# Patient Record
Sex: FEMALE | URBAN - METROPOLITAN AREA
[De-identification: names, ages, dates, MRNs, and addresses within clinical notes are randomized per-mention and may not be internally consistent; named-entity substitution may affect disease eponyms.]

---

## 2019-01-30 ENCOUNTER — APPOINTMENT (RX ONLY)
Dept: URBAN - METROPOLITAN AREA CLINIC 24 | Facility: CLINIC | Age: 64
Setting detail: DERMATOLOGY
End: 2019-01-30

## 2019-01-30 DIAGNOSIS — L28.1 PRURIGO NODULARIS: ICD-10-CM

## 2019-01-30 PROBLEM — L85.3 XEROSIS CUTIS: Status: ACTIVE | Noted: 2019-01-30

## 2019-01-30 PROBLEM — L29.8 OTHER PRURITUS: Status: ACTIVE | Noted: 2019-01-30

## 2019-01-30 PROCEDURE — ? PRESCRIPTION

## 2019-01-30 PROCEDURE — ? COUNSELING

## 2019-01-30 PROCEDURE — 99202 OFFICE O/P NEW SF 15 MIN: CPT

## 2019-01-30 RX ORDER — TRIAMCINOLONE ACETONIDE 1 MG/G
1 OINTMENT TOPICAL BID
Qty: 1 | Refills: 3 | COMMUNITY
Start: 2019-01-30

## 2019-01-30 RX ADMIN — TRIAMCINOLONE ACETONIDE 1: 1 OINTMENT TOPICAL at 17:22

## 2019-01-30 ASSESSMENT — LOCATION DETAILED DESCRIPTION DERM
LOCATION DETAILED: SUPERIOR THORACIC SPINE
LOCATION DETAILED: LEFT PROXIMAL DORSAL FOREARM
LOCATION DETAILED: MIDDLE STERNUM
LOCATION DETAILED: RIGHT PROXIMAL DORSAL FOREARM

## 2019-01-30 ASSESSMENT — LOCATION ZONE DERM
LOCATION ZONE: ARM
LOCATION ZONE: TRUNK

## 2019-01-30 ASSESSMENT — LOCATION SIMPLE DESCRIPTION DERM
LOCATION SIMPLE: LEFT FOREARM
LOCATION SIMPLE: UPPER BACK
LOCATION SIMPLE: RIGHT FOREARM
LOCATION SIMPLE: CHEST

## 2019-03-13 ENCOUNTER — APPOINTMENT (RX ONLY)
Dept: URBAN - METROPOLITAN AREA CLINIC 24 | Facility: CLINIC | Age: 64
Setting detail: DERMATOLOGY
End: 2019-03-13

## 2019-03-13 DIAGNOSIS — L90.5 SCAR CONDITIONS AND FIBROSIS OF SKIN: ICD-10-CM

## 2019-03-13 DIAGNOSIS — L81.0 POSTINFLAMMATORY HYPERPIGMENTATION: ICD-10-CM

## 2019-03-13 DIAGNOSIS — L28.1 PRURIGO NODULARIS: ICD-10-CM | Status: IMPROVED

## 2019-03-13 PROCEDURE — 99213 OFFICE O/P EST LOW 20 MIN: CPT

## 2019-03-13 PROCEDURE — ? COUNSELING

## 2019-03-13 PROCEDURE — ? TREATMENT REGIMEN

## 2019-03-13 ASSESSMENT — LOCATION DETAILED DESCRIPTION DERM
LOCATION DETAILED: SUPERIOR THORACIC SPINE
LOCATION DETAILED: RIGHT PROXIMAL DORSAL FOREARM
LOCATION DETAILED: PERIUMBILICAL SKIN
LOCATION DETAILED: MIDDLE STERNUM
LOCATION DETAILED: LEFT PROXIMAL DORSAL FOREARM

## 2019-03-13 ASSESSMENT — LOCATION SIMPLE DESCRIPTION DERM
LOCATION SIMPLE: UPPER BACK
LOCATION SIMPLE: ABDOMEN
LOCATION SIMPLE: RIGHT FOREARM
LOCATION SIMPLE: LEFT FOREARM
LOCATION SIMPLE: CHEST

## 2019-03-13 ASSESSMENT — LOCATION ZONE DERM
LOCATION ZONE: TRUNK
LOCATION ZONE: ARM

## 2019-03-13 NOTE — PROCEDURE: TREATMENT REGIMEN
Detail Level: Zone
Plan: To consider treatment of pigmentation in the future. \\nAdvised pt that TAC oint should not be used for hyperpigmentation\\nWill address at follow up in 3-4 months

## 2019-11-12 NOTE — EDPHYS
Physician Documentation                                                                           

 [facility]                                                                 

Name: Nery Gaffney                                                                            

Age: 63 yrs                                                                                       

Sex: Female                                                                                       

: 1955                                                                                   

MRN: [id number]                                                                                   

Arrival Date: 2019                                                                          

Time: 13:56                                                                                       

Account#: [id number]                                                                            

Bed 13                                                                                            

Private MD: Bcuk Pandey R ED Physician Brown Dumont                                                                         

HPI:                                                                                              

                                                                                             

15:01 This 63 yrs old  Female presents to ER via Ambulatory with complaints of Wrist kb  

      Pain.                                                                                       

15:01 The patient or guardian reports decreased range of motion, pain, swelling, tenderness.  kb  

15:02 The patient or guardian reports decreased range of motion, pain, swelling. The          kb  

      complaints affect the left wrist diffusely. Context: The problem was sustained at home,     

      resulted from an unknown cause. Onset: The symptoms/episode began/occurred 2 day(s)         

      ago. Modifying factors: The symptoms are alleviated by nothing, the symptoms are            

      aggravated by nothing. Associated signs and symptoms: The patient has no apparent           

      associated signs or symptoms. The patient has not experienced similar symptoms in the       

      past. The patient has not recently seen a physician. Pt reports she went to Dr Pandey        

      today for redness, swelling, pain and decreased ROM in left wrist. He sent her here for     

      gout vs septic joint.                                                                       

                                                                                                  

Historical:                                                                                       

- Allergies:                                                                                      

14:04 HYDROCODONE;                                                                            tw2 

14:04 Iodine; IV contrast;                                                                    tw2 

- Home Meds:                                                                                      

14:04 metformin 1,000 mg oral tab 1 tab 2 times per day [Active];                             tw2 

      lisinopril-hydrochlorothiazide 20-25 mg Oral tab 1 tab once daily [Active]; glimepiride     

      4 mg Oral tab 1 tab twice a day [Active]; trazodone 100 mg Oral tab 1 tab daily             

      [Active]; cyclobenzaprine 10 mg oral tab [Active]; simvastatin 40 mg Oral tab 1 tab         

      once daily [Active]; gabapentin 300 mg oral cap 2 caps nightly [Active]; basaglar 15        

      units sq [Active];                                                                          

- PMHx:                                                                                           

14:04 Depression; Diabetes - NIDDM; Hyperlipidemia; Hypertension;                             tw2 

                                                                                                  

- Immunization history:: Adult Immunizations.                                                     

- Social history:: Smoking status: Patient uses tobacco products, smokes one pack                 

  cigarettes per day.                                                                             

- Ebola Screening: : Patient denies travel to an Ebola-affected area in the 21 days               

  before illness onset.                                                                           

                                                                                                  

                                                                                                  

ROS:                                                                                              

14:59 Constitutional: Negative for fever, chills, and weight loss, Cardiovascular: Negative   kb  

      for chest pain, palpitations, and edema, Respiratory: Negative for shortness of breath,     

      cough, wheezing, and pleuritic chest pain, Abdomen/GI: Negative for abdominal pain,         

      nausea, vomiting, diarrhea, and constipation, Back: Negative for injury and pain, :       

      Negative for injury, bleeding, discharge, and swelling, Neuro: Negative for headache,       

      weakness, numbness, tingling, and seizure.                                                  

14:59 MS/extremity: Positive for decreased range of motion, erythema, pain, swelling, of the      

      left wrist.                                                                                 

                                                                                                  

Exam:                                                                                             

14:59 Constitutional:  This is a well developed, well nourished patient who is awake, alert,  kb  

      and in no acute distress. Head/Face:  Normocephalic, atraumatic. ENT:  Nares patent. No     

      nasal discharge, no septal abnormalities noted.  Tympanic membranes are normal and          

      external auditory canals are clear.  Oropharynx with no redness, swelling, or masses,       

      exudates, or evidence of obstruction, uvula midline.  Mucous membranes moist. Neck:         

      Trachea midline, no thyromegaly or masses palpated, and no cervical lymphadenopathy.        

      Supple, full range of motion without nuchal rigidity, or vertebral point tenderness.        

      No Meningismus. Chest/axilla:  Normal chest wall appearance and motion.  Nontender with     

      no deformity.  No lesions are appreciated. Cardiovascular:  Regular rate and rhythm         

      with a normal S1 and S2.  No gallops, murmurs, or rubs.  Normal PMI, no JVD.  No pulse      

      deficits. Respiratory:  Lungs have equal breath sounds bilaterally, clear to                

      auscultation and percussion.  No rales, rhonchi or wheezes noted.  No increased work of     

      breathing, no retractions or nasal flaring. Abdomen/GI:  Soft, non-tender, with normal      

      bowel sounds.  No distension or tympany.  No guarding or rebound.  No evidence of           

      tenderness throughout. Neuro:  Awake and alert, GCS 15, oriented to person, place,          

      time, and situation.  Cranial nerves II-XII grossly intact.  Motor strength 5/5 in all      

      extremities.  Sensory grossly intact.  Cerebellar exam normal.  Normal gait.                

14:59 Musculoskeletal/extremity: Extremities: grossly normal except: noted in the left wrist:     

      decreased ROM, erythema, pain, swelling, ROM: limited active range of motion,               

      Circulation is intact in all extremities. Sensation intact.                                 

                                                                                                  

Vital Signs:                                                                                      

14:01  / 78; Pulse 102; Resp 17; Temp 97.8(O); Pulse Ox 97% on R/A; Weight 68.04 kg     tw2 

      (R); Height 5 ft. 7 in. (170.18 cm); Pain 8/10;                                             

15:40  / 82; Pulse 95; Resp 16; Temp 98.0(O); Pulse Ox 96% on R/A;                      mh5 

16:46  / 82; Pulse 89; Resp 18; Temp 97.9(O); Pulse Ox 97% on R/A;                      mh5 

17:39  / 76; Pulse 88; Resp 17; Pulse Ox 95% on R/A;                                    rb1 

18:20  / 73; Pulse 89; Resp 16; Pulse Ox 96% on R/A;                                    rb1 

14:01 Body Mass Index 23.49 (68.04 kg, 170.18 cm)                                             tw2 

                                                                                                  

MDM:                                                                                              

14:15 Patient medically screened.                                                             kb  

15:01 Data reviewed: vital signs, nurses notes. Data interpreted: Pulse oximetry: on room air kb  

      is 97 %. Interpretation: normal.                                                            

15:58 ED course: Dr Dumont at bedside to obtain sample of joint fluid..                        kb  

17:54 ED course: Consulted Marisel Scherer, given fluid cell count analysis, he believes is more rn  

      inflammatory arthritis, mild elevation in CRP and WBC, but normal ESR and                   

      procalcitonin. Not enough fluid to evaluate for crystals, but could be gout or              

      pseudogout. Pt afebrile, non-IV drug user, and improved ROM after joint aspiration . If     

      gram stain neg for organisms, will dc home..                                                

18:03 Counseling: I had a detailed discussion with the patient and/or guardian regarding: the kb  

      historical points, exam findings, and any diagnostic results supporting the                 

      discharge/admit diagnosis, lab results, radiology results, the need for outpatient          

      follow up, a orthopedic surgeon, to return to the emergency department if symptoms          

      worsen or persist or if there are any questions or concerns that arise at home.             

                                                                                                  

                                                                                             

14:29 Order name: CBC with Diff; Complete Time: 15:39                                         kb  

                                                                                             

14:29 Order name: Basic Metabolic Panel; Complete Time: 15:27                                 kb  

                                                                                             

14:29 Order name: Procalcitonin; Complete Time: 15:55                                         kb  

                                                                                             

14:29 Order name: Blood Culture Adult (2)                                                     kb  

                                                                                             

14:29 Order name: Sed Rate; Complete Time: 15:39                                              kb  

                                                                                             

14:29 Order name: CRP; Complete Time: 15:27                                                   kb  

                                                                                             

14:29 Order name: IV Start; Complete Time: 16:43                                              kb  

                                                                                             

16:15 Order name: Body Fluid Culture                                                          kb  

                                                                                             

16:15 Order name: Fluid Cell Count,Body; Complete Time: 17:37                                 kb  

                                                                                             

17:04 Order name: Wrist Left (3 View) XRAY: base of thumb; Complete Time: 17:48               sp  

                                                                                             

18:00 Order name: Uric Acid; Complete Time: 07:04                                             kb  

                                                                                                  

Administered Medications:                                                                         

16:10 Drug: Lidocaine (1 %) 1 vials Volume: 5 ml; Route: Infiltration;                        rb1 

                                                                                                  

                                                                                                  

Disposition:                                                                                      

18:31 Co-signature as Attending Physician, Brown Dumont MD.                                    rn  

                                                                                                  

Disposition:                                                                                      

19 18:04 Discharged to Home. Impression: Pain in left wrist.                                

- Condition is Stable.                                                                            

- Discharge Instructions: Wrist Pain, Easy-to-Read.                                               

- Prescriptions for Tramadol 50 mg Oral Tablet - take 1 tablet by ORAL route every 8              

  hours as needed; 12 tablet. indomethacin 25 mg Oral capsule - take 1 capsule by ORAL            

  route 3 times per day As needed with food; 30 capsule.                                          

- Medication Reconciliation Form, Thank You Letter, Antibiotic Education, Prescription            

  Opioid Use form.                                                                                

- Follow up: Emergency Department; When: As needed; Reason: Worsening of condition.               

  Follow up: Buck Pandey MD; When: 2 - 3 days; Reason: Recheck today's complaints,              

  Continuance of care, Re-evaluation by your physician. Follow up: Carlitos Scherer MD;           

  When: 2 - 3 days; Reason: Recheck today's complaints.                                           

                                                                                                  

                                                                                                  

                                                                                                  

Signatures:                                                                                       

Dispatcher [employer]                           Leslie Escobar, AMEENAP-C                 FNP-Ckb                                                   

Herlinda Whitaker, Brown Kaur RN, MD MD rn Barber, Rebecca, RN RN   rb1                                                  

Dianna Oakley RN RN   tw2                                                  

                                                                                                  

Corrections: (The following items were deleted from the chart)                                    

15:02 15:01 This 63 yrs old  Female presents to ER via Ambulatory with complaints of kb  

      Hand Pain. kb                                                                               

18:04 16:15 FLUID CRYSTALS+U.LAB.BRZ ordered. EDMS                                            EDMS

18:25 18:04 2019 18:04 Discharged to Home. Impression: Pain in left wrist. Condition is iw  

      Stable. Forms are Medication Reconciliation Form, Thank You Letter, Antibiotic              

      Education, Prescription Opioid Use. Follow up: Emergency Department; When: As needed;       

      Reason: Worsening of condition. Follow up: Buck Pandey; When: 2 - 3 days; Reason:           

      Recheck today's complaints, Continuance of care, Re-evaluation by your physician.           

      Follow up: Carlitos Scherer; When: 2 - 3 days; Reason: Recheck today's complaints. kb         

                                                                                                  

**************************************************************************************************

## 2019-11-12 NOTE — ER
Nurse's Notes                                                                                     

 [facility]                                                                 

Name: Nery Gaffney                                                                            

Age: 63 yrs                                                                                       

Sex: Female                                                                                       

: 1955                                                                                   

MRN: [id number]                                                                                   

Arrival Date: 2019                                                                          

Time: 13:56                                                                                       

Account#: [id number]                                                                            

Bed 13                                                                                            

Private MD: Buck Pandey R                                                                       

Diagnosis: Pain in left wrist                                                                     

                                                                                                  

Presentation:                                                                                     

                                                                                             

14:00 Presenting complaint: Patient states: it started hurting yesterday, and it has been     tw2 

      swollen and red and Dr. Pandey was worried about me being septic and he said it might        

      just be gout but to come here. Transition of care: patient was not received from            

      another setting of care. Onset of symptoms was 2019. Risk Assessment: Do       

      you want to hurt yourself or someone else? Patient reports no desire to harm self or        

      others. Initial Sepsis Screen: Does the patient meet any 2 criteria? No. Patient's          

      initial sepsis screen is negative. Does the patient have a suspected source of              

      infection? No. Patient's initial sepsis screen is negative. Care prior to arrival: None.    

14:00 Method Of Arrival: Ambulatory                                                           tw2 

14:00 Acuity: RANJEET 3                                                                           tw2 

                                                                                                  

Triage Assessment:                                                                                

14:01 General: Appears in no apparent distress. Behavior is calm, cooperative, appropriate    tw2 

      for age. Pain: Complains of pain in left hand. Musculoskeletal: Swelling present in         

      left hand.                                                                                  

                                                                                                  

Historical:                                                                                       

- Allergies:                                                                                      

14:04 HYDROCODONE;                                                                            tw2 

14:04 Iodine; IV contrast;                                                                    tw2 

- Home Meds:                                                                                      

14:04 metformin 1,000 mg oral tab 1 tab 2 times per day [Active];                             tw2 

      lisinopril-hydrochlorothiazide 20-25 mg Oral tab 1 tab once daily [Active]; glimepiride     

      4 mg Oral tab 1 tab twice a day [Active]; trazodone 100 mg Oral tab 1 tab daily             

      [Active]; cyclobenzaprine 10 mg oral tab [Active]; simvastatin 40 mg Oral tab 1 tab         

      once daily [Active]; gabapentin 300 mg oral cap 2 caps nightly [Active]; basaglar 15        

      units sq [Active];                                                                          

- PMHx:                                                                                           

14:04 Depression; Diabetes - NIDDM; Hyperlipidemia; Hypertension;                             tw2 

                                                                                                  

- Immunization history:: Adult Immunizations.                                                     

- Social history:: Smoking status: Patient uses tobacco products, smokes one pack                 

  cigarettes per day.                                                                             

- Ebola Screening: : Patient denies travel to an Ebola-affected area in the 21 days               

  before illness onset.                                                                           

                                                                                                  

                                                                                                  

Screenin:12 Abuse screen: Denies threats or abuse. Nutritional screening: No deficits noted.        tw2 

      Tuberculosis screening: No symptoms or risk factors identified. Fall Risk None              

      identified.                                                                                 

                                                                                                  

Assessment:                                                                                       

14:10 General: Appears in no apparent distress. comfortable, Behavior is calm, cooperative.   rb1 

      Pain: Complains of pain in left wrist Pain currently is 8 out of 10 on a pain scale.        

      Pain began 1 day ago. Neuro: Level of Consciousness is awake, alert, obeys commands,        

      Oriented to person, place, time, situation. Cardiovascular: Capillary refill < 3            

      seconds is brisk in bilateral fingers. Respiratory: Airway is patent Respiratory effort     

      is even, unlabored, Respiratory pattern is regular, symmetrical. GI: No signs and/or        

      symptoms were reported involving the gastrointestinal system. : No signs and/or           

      symptoms were reported regarding the genitourinary system. Derm: Skin is pink, warm \T\     

      dry. Musculoskeletal: Range of motion: limited in left hand and wrist Swelling present      

      in left wrist and left hand.                                                                

15:00 Reassessment: Patient appears in no apparent distress at this time. No changes from     rb1 

      previously documented assessment.                                                           

16:00 Reassessment: Patient appears in no apparent distress at this time. Patient and/or      rb1 

      family updated on plan of care and expected duration. Pain level reassessed. Patient is     

      alert, oriented x 3, equal unlabored respirations, skin warm/dry/pink. Set up for a         

      wrist aspiration procedure.                                                                 

16:15 Reassessment: Dr. Dumont at pt. bedside performing wrist aspiration.                     rb1 

16:50 Reassessment: Patient appears in no apparent distress at this time. Patient and/or      rb1 

      family updated on plan of care and expected duration. Pain level reassessed. Patient is     

      alert, oriented x 3, equal unlabored respirations, skin warm/dry/pink. Pt. is sitting       

      in the bedside chair watching TV.                                                           

17:40 Reassessment: Patient appears in no apparent distress at this time. No changes from     rb1 

      previously documented assessment.                                                           

18:20 Reassessment: Patient appears in no apparent distress at this time. Patient and/or      rb1 

      family updated on plan of care and expected duration. Pain level reassessed. Patient is     

      alert, oriented x 3, equal unlabored respirations, skin warm/dry/pink.                      

                                                                                                  

Vital Signs:                                                                                      

14:01  / 78; Pulse 102; Resp 17; Temp 97.8(O); Pulse Ox 97% on R/A; Weight 68.04 kg     tw2 

      (R); Height 5 ft. 7 in. (170.18 cm); Pain 8/10;                                             

15:40  / 82; Pulse 95; Resp 16; Temp 98.0(O); Pulse Ox 96% on R/A;                      mh5 

16:46  / 82; Pulse 89; Resp 18; Temp 97.9(O); Pulse Ox 97% on R/A;                      mh5 

17:39  / 76; Pulse 88; Resp 17; Pulse Ox 95% on R/A;                                    rb1 

18:20  / 73; Pulse 89; Resp 16; Pulse Ox 96% on R/A;                                    rb1 

14:01 Body Mass Index 23.49 (68.04 kg, 170.18 cm)                                             tw2 

                                                                                                  

ED Course:                                                                                        

13:56 Patient arrived in ED.                                                                  mr  

13:56 Buck Pandey MD is Private Physician.                                                  mr  

14:01 Triage completed.                                                                       tw2 

14:01 Arm band placed on.                                                                     tw2 

14:04 Placed in gown. Bed in low position. Adult w/ patient. Warm blanket given.              tw2 

14:15 [facility]                                                        kb  

14:15 Brown Dumont MD is Attending Physician.                                                kb  

14:50 Inserted saline lock: 22 gauge in right forearm, using aseptic technique. Blood         jl7 

      collected.                                                                                  

14:50 Initial lab(s) drawn, by me, sent to lab. First set of blood cultures drawn by me.      jl7 

16:42 Elina Reese, RN is Primary Nurse.                                                   rb1 

17:00 Second set of blood cultures drawn by me.                                               mh5 

17:06 Blood Culture Adult (2) Sent.                                                           mh5 

17:29 Wrist Left (3 View) XRAY: base of thumb In Process Unspecified.                         EDMS

18:04 Buck Pandey MD is Referral Physician.                                                 kb  

18:04 Carlitos Scherer MD is Referral Physician.                                              kb  

18:24 No provider procedures requiring assistance completed.                                  rb1 

18:25 IV discontinued, intact, bleeding controlled, No redness/swelling at site. Pressure     iw  

      dressing applied.                                                                           

                                                                                                  

Administered Medications:                                                                         

16:10 Drug: Lidocaine (1 %) 1 vials Volume: 5 ml; Route: Infiltration;                        rb1 

                                                                                                  

                                                                                                  

Outcome:                                                                                          

18:04 Discharge ordered by MD. last  

18:24 Discharged to home ambulatory, with family.                                             iw  

18:24 Condition: good                                                                             

18:24 Discharge instructions given to patient, family, Instructed on discharge instructions,      

      follow up and referral plans. Demonstrated understanding of instructions, follow-up         

      care, medications, Prescriptions given X 2.                                                 

18:25 Patient left the ED.                                                                    iw  

                                                                                                  

Signatures:                                                                                       

Dispatcher [employer]                           EDMS                                                 

Leslie Rudd, MAGNUS-C                 FNP-Ckandreina                                                   

Shirley Allen                                                   

Herlinda Whitaker, RN                     RN   iw                                                   

Elina Reese, RN                     RN   rb1                                                  

Dianna Oakley RN                          RN   2                                                  

Flavia Sevilla                              [facility]                                                  

Roxann Carter RN                        RN   jl7                                                  

                                                                                                  

Corrections: (The following items were deleted from the chart)                                    

14:11 14:00 Presenting complaint: Patient states: it started hurting yesterday, and it has    tw2 

      been swollen and red and Dr. Pandey was worried about me being septic and he said it         

      might just be gout tw2                                                                      

17:06 16:46  / 82; Pulse 89bpm; Resp 18bpm; Pulse Ox 97% RA; mh5                        mh5 

17:06 15:40  / 82; Pulse 95bpm; Resp 16bpm; Pulse Ox 96% RA; rb1                        mh5 

                                                                                                  

**************************************************************************************************

## 2019-11-22 NOTE — RAD REPORT
EXAM DESCRIPTION:  RAD - Wrist Left 3 View - 11/12/2019 5:29 pm

 

CLINICAL HISTORY:  Swelling;Pain

Pain

 

COMPARISON:  No comparisons

 

FINDINGS:  Soft tissue swelling is seen about the wrist. No fracture or dislocation seen. none

## 2020-07-30 NOTE — RAD REPORT
EXAM DESCRIPTION:  RAD - Chest Single View - 7/30/2020 5:08 pm

 

CLINICAL HISTORY:  left scapular pain

Chest pain.

 

COMPARISON:  Chest Single View dated 1/29/2018; Chest Single View dated 1/28/2018; Chest Pa And Lat (
2 Views) dated 10/30/2016; CHEST PA AND LAT 2 VIEW dated 9/7/2010

 

FINDINGS:  Portable technique limits examination quality.

 

Interstitial lung opacities are present bilaterally suspicious for interstitial pneumonitis. Airspace
 opacity in left lung base likely represent superimposed pneumonia with moderate left pleural effusio
n. The heart is normal in size. No displaced fractures.Follow-up CT chest may be useful for further e
valuation.

## 2020-07-30 NOTE — EDPHYS
Physician Documentation                                                                           

 [facility]                                                                 

Name: Nery Gaffney                                                                            

Age: 64 yrs                                                                                       

Sex: Female                                                                                       

: 1955                                                                                   

MRN: [id number]                                                                                   

Arrival Date: 2020                                                                          

Time: 15:42                                                                                       

Account#: [id number]                                                                            

Bed 8                                                                                             

Private MD: Buck Pandey R                                                                       

ED Physician Abdiel Ortega                                                                       

HPI:                                                                                              

                                                                                             

11:10 This 64 yrs old  Female presents to ER via Ambulatory with complaints of       kdr 

      Shoulder Pain.                                                                              

11:10 The patient or guardian complains of pain, that is acute, The patient is c/o pain in    kdr 

      the left scapular region without history of trauma. Left scapula. Context: The problem      

      was sustained at home. Onset: The symptoms/episode began/occurred gradually, at an          

      unknown time. Modifying factors: the symptoms are alleviated by nothing. The symptoms       

      are aggravated by nothing. Associated signs and symptoms: The patient has no apparent       

      associated signs or symptoms. Severity of symptoms: At their worst the symptoms were        

      mild, moderate, just prior to arrival, in the emergency department the symptoms are         

      unchanged. Treatment prior to arrival includes: no previous treatment. The patient has      

      not experienced similar symptoms in the past. The patient has not recently seen a           

      physician.                                                                                  

                                                                                                  

Historical:                                                                                       

- Allergies:                                                                                      

                                                                                             

16:13 HYDROCODONE;                                                                            ks7 

16:13 Iodine; IV contrast;                                                                    ks7 

- PMHx:                                                                                           

16:13 Depression; Diabetes - NIDDM; Hyperlipidemia; Hypertension;                             ks7 

- PSHx:                                                                                           

16:13 Hysterectomy; Hernia repair; Cholecystectomy;                                           ks7 

                                                                                                  

- Immunization history:: Adult Immunizations up to date.                                          

- Social history:: Smoking status: Patient reports the use of cigarette tobacco                   

  products, smokes one-half pack cigarettes per day.                                              

                                                                                                  

                                                                                                  

ROS:                                                                                              

                                                                                             

11:10 Constitutional: Negative for fever, chills, and weight loss, Eyes: Negative for injury, kdr 

      pain, redness, and discharge, ENT: Negative for injury, pain, and discharge, Neck:          

      Negative for injury, pain, and swelling, Cardiovascular: Negative for chest pain,           

      palpitations, and edema, Respiratory: Negative for shortness of breath, cough,              

      wheezing, and pleuritic chest pain, Abdomen/GI: Negative for abdominal pain, nausea,        

      vomiting, diarrhea, and constipation, : Negative for injury, bleeding, discharge, and     

      swelling, MS/Extremity: Negative for injury and deformity, Skin: Negative for injury,       

      rash, and discoloration, Neuro: Negative for headache, weakness, numbness, tingling,        

      and seizure activity. Psych: Negative for depression, anxiety, suicide ideation,            

      homicidal ideation, and hallucinations, Allergy/Immunology: Negative for hives, rash,       

      and allergies, Endocrine: Negative for neck swelling, polydipsia, polyuria, polyphagia,     

      and marked weight changes, Hematologic/Lymphatic: Negative for swollen nodes, abnormal      

      bleeding, and unusual bruising.                                                             

      Back: Positive for pain at rest, of the left scapular area.                                 

                                                                                                  

Exam:                                                                                             

11:10 Constitutional:  This is a well developed, well nourished patient who is awake, alert,  kdr 

      and in no acute distress. Head/Face:  Normocephalic, atraumatic. Eyes:  Pupils equal        

      round and reactive to light, extra-ocular motions intact.  Lids and lashes normal.          

      Conjunctiva and sclera are non-icteric and not injected.  Cornea within normal limits.      

      Periorbital areas with no swelling, redness, or edema. Neck:  Trachea midline, no           

      thyromegaly or masses palpated, and no cervical lymphadenopathy.  Supple, full range of     

      motion without nuchal rigidity, or vertebral point tenderness.  No Meningismus.             

      Chest/axilla:  Normal chest wall appearance and motion.  Nontender with no deformity.       

      No lesions are appreciated. Cardiovascular:  Regular rate and rhythm with a normal S1       

      and S2.  No gallops, murmurs, or rubs.  Normal PMI, no JVD.  No pulse deficits.             

      Respiratory:  Lungs have equal breath sounds bilaterally, clear to auscultation and         

      percussion.  No rales, rhonchi or wheezes noted.  No increased work of breathing, no        

      retractions or nasal flaring. Abdomen/GI:  Soft, non-tender, with normal bowel sounds.      

      No distension or tympany.  No guarding or rebound.  No evidence of tenderness               

      throughout. Back:  No spinal tenderness.  No costovertebral tenderness.  Full range of      

      motion. Skin:  Warm, dry with normal turgor.  Normal color with no rashes, no lesions,      

      and no evidence of cellulitis. MS/ Extremity:  Pulses equal, no cyanosis.                   

      Neurovascular intact.  Full, normal range of motion. Neuro:  Awake and alert, GCS 15,       

      oriented to person, place, time, and situation.  Cranial nerves II-XII grossly intact.      

      Motor strength 5/5 in all extremities.  Sensory grossly intact.  Cerebellar exam            

      normal.  Normal gait. Psych:  Awake, alert, with orientation to person, place and time.     

       Behavior, mood, and affect are within normal limits.                                       

                                                                                                  

Vital Signs:                                                                                      

                                                                                             

16:09  / 77; Pulse 110; Resp 18; Pulse Ox 96% on R/A;                                   ks7 

18:03  / 59; Pulse 108; Resp 17; Pulse Ox 96% ;                                         bp  

18:55  / 78; Pulse 112; Resp 18; Pulse Ox 96% ; Pain 0/10;                              ks7 

19:11  / 86; Pulse 106; Resp 18 S; Pulse Ox 95% on R/A;                                 jd3 

                                                                                                  

MDM:                                                                                              

19:13 Patient medically screened.                                                             [facility] 

                                                                                             

11:10 Data reviewed: vital signs, nurses notes, lab test result(s), radiologic studies.       kdr 

      Counseling: I had a detailed discussion with the patient and/or guardian regarding: the     

      historical points, exam findings, and any diagnostic results supporting the                 

      discharge/admit diagnosis, lab results, radiology results, the need for outpatient          

      follow up.                                                                                  

                                                                                                  

                                                                                             

16:26 Order name: CBC with Diff; Complete Time: 17:55                                         [facility] 

                                                                                             

16:26 Order name: Chem 7; Complete Time: 17:55                                                [facility] 

                                                                                             

16:26 Order name: CXR XRAY; Complete Time: 17:55                                              [facility] 

                                                                                             

16:26 Order name: Blood Culture Adult (2)                                                     kdr 

                                                                                             

16:26 Order name: Lactate; Complete Time: 17:55                                               [facility] 

                                                                                             

16:26 Order name: Procalcitonin; Complete Time: 17:55                                         [facility] 

                                                                                             

18:01 Order name: Thorax Wo Con; Complete Time: 19:11                                         [facility]

                                                                                                  

Administered Medications:                                                                         

                                                                                             

16:40 Drug: NS 0.9% 500 ml Route: IV; Rate: bolus; Site: left antecubital;                    bp  

16:40 Drug: Rocephin - (cefTRIAXone) 1 grams Route: IVPB; Infused Over: 30 mins; Site: left   bp  

      antecubital;                                                                                

                                                                                                  

                                                                                                  

Disposition:                                                                                      

20 19:13 Discharged to Home. Impression: Left scapular pain, mediatinal mass, loculated     

  left pleural effusion.                                                                          

- Condition is Stable.                                                                            

- Discharge Instructions: Shoulder Pain, Easy-to-Read, Lung Cancer.                               

- Prescriptions for Tramadol 50 mg Oral Tablet - take 1 tablet by ORAL route every 8              

  hours as needed; 16 tablet. Albuterol Sulfate 90 mcg/actuation - inhale 1-2 puff by             

  INHALATION route every 4-6 hours; 1 Inhaler.                                                    

- Medication Reconciliation Form, Thank You Letter form.                                          

- Follow up: Buck Pandey MD; When: 2 - 3 days; Reason: If symptoms return, Further              

  diagnostic work-up, Recheck today's complaints, Continuance of care, Re-evaluation by           

  your physician.                                                                                 

- Problem is new.                                                                                 

- Symptoms are unchanged.                                                                         

                                                                                                  

                                                                                                  

                                                                                                  

Signatures:                                                                                       

Dispatcher [employer]                           [facility]                                                 

Abdiel Ortega MD MD   kdr                                                  

Zain Weaver RN                    RN   jd3                                                  

Tra, Jorge L, RN                      RN   bp                                                   

Nuria Archer RN                  RN   ks7                                                  

                                                                                                  

Corrections: (The following items were deleted from the chart)                                    

18:02 17:58 Chest For PE Angio+CT.RAD.BRZ ordered. [facility]

19:48 19:13 2020 19:13 Discharged to Home. Impression: Left scapular pain, mediatinal   jd3 

      mass, loculated left pleural effusion. Condition is Stable. Forms are Medication            

      Reconciliation Form, Thank You Letter, Antibiotic Education, Prescription Opioid Use.       

      Follow up: Buck Pandey; When: 2 - 3 days; Reason: If symptoms return, Further               

      diagnostic work-up, Recheck today's complaints, Continuance of care, Re-evaluation by       

      your physician. Problem is new. Symptoms are unchanged. kdr                                 

                                                                                                  

**************************************************************************************************

## 2020-07-30 NOTE — ER
Nurse's Notes                                                                                     

 [facility]                                                                 

Name: Nery Gaffney                                                                            

Age: 64 yrs                                                                                       

Sex: Female                                                                                       

: 1955                                                                                   

MRN: [id number]                                                                                   

Arrival Date: 2020                                                                          

Time: 15:42                                                                                       

Account#: [id number]                                                                            

Bed 8                                                                                             

Private MD: Buck Pandey R                                                                       

Diagnosis: Left scapular pain, mediatinal mass, loculated left pleural effusion                   

                                                                                                  

Presentation:                                                                                     

                                                                                             

16:11 Chief complaint: Patient states: L shoulder pain radiating to her back. pt rates 9/10.  ks7 

      Ebola Screen: Patient negative for fever greater than or equal to 101.5 degrees             

      Fahrenheit, and additional compatible Ebola Virus Disease symptoms Patient denies           

      exposure to infectious person. Patient denies travel to an Ebola-affected area in the       

      21 days before illness onset.                                                               

16:11 Method Of Arrival: Ambulatory                                                           ks7 

17:56 Acuity: RANJEET 3                                                                           iw  

19:12 Coronavirus screen: At this time, the client does not indicate any symptoms associated  jd3 

      with coronavirus-19. Initial Sepsis Screen: Does the patient meet any 2 criteria? No.       

      Patient's initial sepsis screen is negative. Does the patient have a suspected source       

      of infection? No. Patient's initial sepsis screen is negative. Risk Assessment: Do you      

      want to hurt yourself or someone else? Patient reports no desire to harm self or            

      others. Onset of symptoms was 2020.                                                

                                                                                                  

Triage Assessment:                                                                                

16:13 General: Appears uncomfortable, slender, Behavior is cooperative. Pain: Complains of    ks7 

      pain in left shoulder Pain radiates to back Pain currently is 9 out of 10 on a pain         

      scale. Quality of pain is described as aching. Musculoskeletal: Reports pain in left        

      shoulder since this am.                                                                     

                                                                                                  

Historical:                                                                                       

- Allergies:                                                                                      

16:13 HYDROCODONE;                                                                            ks7 

16:13 Iodine; IV contrast;                                                                    ks7 

- PMHx:                                                                                           

16:13 Depression; Diabetes - NIDDM; Hyperlipidemia; Hypertension;                             ks7 

- PSHx:                                                                                           

16:13 Hysterectomy; Hernia repair; Cholecystectomy;                                           ks7 

                                                                                                  

- Immunization history:: Adult Immunizations up to date.                                          

- Social history:: Smoking status: Patient reports the use of cigarette tobacco                   

  products, smokes one-half pack cigarettes per day.                                              

                                                                                                  

                                                                                                  

Screenin:15 Abuse screen: Denies threats or abuse. Denies injuries from another. Nutritional        ks7 

      screening: No deficits noted. Tuberculosis screening: No symptoms or risk factors           

      identified. Fall Risk None identified.                                                      

                                                                                                  

Assessment:                                                                                       

16:15 General: pt comes in from home, ambulatory, c/o pain in L shoulder radiating to her     ks7 

      back 9/10 pain.                                                                             

16:55 Reassessment: pt ambulated to bathroom independently. denies dizziness. urine collected.ks7 

18:03 Reassessment: VS REMAIN STABLE ON MONITOR. PT TO CT FOR CT THORAX.                      bp  

19:12 Reassessment: Patient states feeling better. General: Appears in no apparent distress.  jd3 

      uncomfortable, Behavior is calm, cooperative, appropriate for age. Pain: Denies pain.       

      Neuro: Level of Consciousness is awake, alert, obeys commands, Oriented to person,          

      place, time, situation. Cardiovascular: Denies chest pain, Capillary refill < 3 seconds     

      Patient's skin is warm and dry. Respiratory: Airway is patent Respiratory effort is         

      even, unlabored, Respiratory pattern is regular, symmetrical. GI: No signs and/or           

      symptoms were reported involving the gastrointestinal system. : No signs and/or           

      symptoms were reported regarding the genitourinary system. EENT: No signs and/or            

      symptoms were reported regarding the EENT system. Derm: Skin is intact, Skin is dry,        

      Skin is normal, Skin temperature is warm. Musculoskeletal: Circulation, motion, and         

      sensation intact. Range of motion: intact in all extremities.                               

19:47 Reassessment: Patient appears in no apparent distress at this time. Patient and/or      jd3 

      family updated on plan of care and expected duration. Pain level reassessed. Patient is     

      alert, oriented x 3, equal unlabored respirations, skin warm/dry/pink. Patient states       

      feeling better.                                                                             

                                                                                                  

Vital Signs:                                                                                      

16:09  / 77; Pulse 110; Resp 18; Pulse Ox 96% on R/A;                                   ks7 

18:03  / 59; Pulse 108; Resp 17; Pulse Ox 96% ;                                         bp  

18:55  / 78; Pulse 112; Resp 18; Pulse Ox 96% ; Pain 0/10;                              ks7 

19:11  / 86; Pulse 106; Resp 18 S; Pulse Ox 95% on R/A;                                 jd3 

                                                                                                  

ED Course:                                                                                        

15:42 Patient arrived in ED.                                                                  ag5 

15:43 Buck Pandey MD is Private Physician.                                                  ag5 

16:08 Nuria Archer, RN is Primary Nurse.                                                ks7 

16:13 Arm band placed on.                                                                     ks7 

16:15 Resting quietly.                                                                        ks7 

16:15 Patient has correct armband on for positive identification. Bed in low position. Call   ks7 

      light in reach. Side rails up X2.                                                           

16:15 No provider procedures requiring assistance completed.                                  ks7 

16:17 Abdiel Ortega MD is Attending Physician.                                              kdr 

16:42 Initial lab(s) drawn, First set of blood cultures drawn. Inserted saline lock: 22 gauge kj1 

      in left antecubital area, using aseptic technique. Blood collected.                         

17:05 Blood Culture Adult (2) Sent.                                                           ks7 

17:08 CXR XRAY In Process Unspecified.                                                        EDMS

17:56 Triage completed.                                                                       iw  

18:10 Thorax Wo Con In Process Unspecified.                                                   EDMS

18:30 pt ambulated to bathroom independently. steady on feet.                                 ks7 

19:12 Buck Pandey MD is Referral Physician.                                                 kdr 

19:47 IV discontinued, intact, bleeding controlled, No redness/swelling at site. Pressure     jd3 

      dressing applied.                                                                           

                                                                                                  

Administered Medications:                                                                         

16:40 Drug: NS 0.9% 500 ml Route: IV; Rate: bolus; Site: left antecubital;                    bp  

16:40 Drug: Rocephin - (cefTRIAXone) 1 grams Route: IVPB; Infused Over: 30 mins; Site: left   bp  

      antecubital;                                                                                

                                                                                                  

                                                                                                  

Outcome:                                                                                          

19:13 Discharge ordered by MD.                                                                kdr 

19:47 Discharged to home ambulatory, with family.                                             jd3 

19:47 Condition: stable                                                                           

19:47 Discharge instructions given to patient, Instructed on discharge instructions, follow       

      up and referral plans. medication usage, Demonstrated understanding of instructions,        

      follow-up care, medications, Prescriptions given X 2.                                       

19:48 Patient left the ED.                                                                    jd3 

                                                                                                  

Signatures:                                                                                       

Dispatcher [employer]                           EDMS                                                 

bAdiel Ortega MD MD   kdr                                                  

Herlinda Whitaker RN RN   iw                                                   

Zain Weaver RN RN   jJorge L Aguirre RN RN   bp                                                   

Francis Durham                                ag5                                                  

Silva Rudd                              kj1                                                  

Nuria Archer, NAEL                  RN   ks7                                                  

                                                                                                  

**************************************************************************************************

## 2020-07-30 NOTE — RAD REPORT
EXAM DESCRIPTION:  CT - Thorax Wo Con

 

CLINICAL HISTORY:  Chest pain

left scapular pain

 

COMPARISON:  Chest For Pe Angio dated 1/29/2018

 

FINDINGS:  Emphysematous changes are present throughout the lungs. A large irregular mass is as seen 
involving the mediastinum, in the anterior mediastinum measuring 6.6 x 5.4 cm, with evidence of bulky
 adenopathy in the precarinal region measuring 3.7 x 2 4 cm and sub- carinal region measuring approxi
mately 6.0 x 5.3 cm. Small moderate partially loculated left pleural effusion is seen. No pneumothora
x. Poorly defined linear opacities are present throughout left lung.

 

Full assessment limited by lack of IV contrast. Small pericardial effusion is possible.

 

No destructive bone lesion.

 

All CT scans are performed using dose optimization technique as appropriate and may include automated
 exposure control or mA/KV adjustment according to patient size.

 

IMPRESSION:  Irregular anterior mediastinal mass (6.6 x 5.4 cm) is present with significant soft tiss
ue mass density seen in the mediastinum as well. Primary differential would include lymphoma or lung 
malignancy with metastatic adenopathy.Full assessment is limited by the lack of IV contrast.

 

Small to moderate partially loculated left pleural effusion.

## 2020-08-06 ENCOUNTER — HOSPITAL ENCOUNTER (OUTPATIENT)
Dept: HOSPITAL 97 - ER | Age: 65
Setting detail: OBSERVATION
LOS: 4 days | Discharge: HOME | End: 2020-08-10
Attending: HOSPITALIST | Admitting: HOSPITALIST
Payer: COMMERCIAL

## 2020-08-06 VITALS — BODY MASS INDEX: 19.4 KG/M2

## 2020-08-06 DIAGNOSIS — E78.00: ICD-10-CM

## 2020-08-06 DIAGNOSIS — R07.89: ICD-10-CM

## 2020-08-06 DIAGNOSIS — E78.5: ICD-10-CM

## 2020-08-06 DIAGNOSIS — G93.89: ICD-10-CM

## 2020-08-06 DIAGNOSIS — Z79.4: ICD-10-CM

## 2020-08-06 DIAGNOSIS — Z91.041: ICD-10-CM

## 2020-08-06 DIAGNOSIS — R94.31: ICD-10-CM

## 2020-08-06 DIAGNOSIS — Z20.828: ICD-10-CM

## 2020-08-06 DIAGNOSIS — R06.02: ICD-10-CM

## 2020-08-06 DIAGNOSIS — Z87.891: ICD-10-CM

## 2020-08-06 DIAGNOSIS — E87.6: ICD-10-CM

## 2020-08-06 DIAGNOSIS — J90: ICD-10-CM

## 2020-08-06 DIAGNOSIS — Z79.899: ICD-10-CM

## 2020-08-06 DIAGNOSIS — E11.9: ICD-10-CM

## 2020-08-06 DIAGNOSIS — F32.9: ICD-10-CM

## 2020-08-06 DIAGNOSIS — I11.9: ICD-10-CM

## 2020-08-06 DIAGNOSIS — C34.92: Primary | ICD-10-CM

## 2020-08-06 DIAGNOSIS — G25.81: ICD-10-CM

## 2020-08-06 DIAGNOSIS — R00.0: ICD-10-CM

## 2020-08-06 LAB
ALBUMIN SERPL BCP-MCNC: 3.1 G/DL (ref 3.4–5)
ALP SERPL-CCNC: 95 U/L (ref 45–117)
ALT SERPL W P-5'-P-CCNC: 17 U/L (ref 12–78)
AST SERPL W P-5'-P-CCNC: 28 U/L (ref 15–37)
BUN BLD-MCNC: 14 MG/DL (ref 7–18)
GLUCOSE SERPLBLD-MCNC: 112 MG/DL (ref 74–106)
HCT VFR BLD CALC: 37.8 % (ref 36–45)
INR BLD: 1.11
LYMPHOCYTES # SPEC AUTO: 1.1 K/UL (ref 0.7–4.9)
MAGNESIUM SERPL-MCNC: 1.5 MG/DL (ref 1.8–2.4)
NT-PROBNP SERPL-MCNC: 292 PG/ML (ref ?–125)
PMV BLD: 7 FL (ref 7.6–11.3)
POTASSIUM SERPL-SCNC: 2.6 MMOL/L (ref 3.5–5.1)
RBC # BLD: 4.08 M/UL (ref 3.86–4.86)
TROPONIN (EMERG DEPT USE ONLY): 0.05 NG/ML (ref 0–0.04)

## 2020-08-06 PROCEDURE — 80076 HEPATIC FUNCTION PANEL: CPT

## 2020-08-06 PROCEDURE — 87102 FUNGUS ISOLATION CULTURE: CPT

## 2020-08-06 PROCEDURE — 76000 FLUOROSCOPY <1 HR PHYS/QHP: CPT

## 2020-08-06 PROCEDURE — 85610 PROTHROMBIN TIME: CPT

## 2020-08-06 PROCEDURE — 85025 COMPLETE CBC W/AUTO DIFF WBC: CPT

## 2020-08-06 PROCEDURE — 93005 ELECTROCARDIOGRAM TRACING: CPT

## 2020-08-06 PROCEDURE — 87116 MYCOBACTERIA CULTURE: CPT

## 2020-08-06 PROCEDURE — 80048 BASIC METABOLIC PNL TOTAL CA: CPT

## 2020-08-06 PROCEDURE — 84132 ASSAY OF SERUM POTASSIUM: CPT

## 2020-08-06 PROCEDURE — 70450 CT HEAD/BRAIN W/O DYE: CPT

## 2020-08-06 PROCEDURE — 94640 AIRWAY INHALATION TREATMENT: CPT

## 2020-08-06 PROCEDURE — 82947 ASSAY GLUCOSE BLOOD QUANT: CPT

## 2020-08-06 PROCEDURE — 36415 COLL VENOUS BLD VENIPUNCTURE: CPT

## 2020-08-06 PROCEDURE — 84484 ASSAY OF TROPONIN QUANT: CPT

## 2020-08-06 PROCEDURE — 94760 N-INVAS EAR/PLS OXIMETRY 1: CPT

## 2020-08-06 PROCEDURE — 88108 CYTOPATH CONCENTRATE TECH: CPT

## 2020-08-06 PROCEDURE — 83735 ASSAY OF MAGNESIUM: CPT

## 2020-08-06 PROCEDURE — 80061 LIPID PANEL: CPT

## 2020-08-06 PROCEDURE — 70553 MRI BRAIN STEM W/O & W/DYE: CPT

## 2020-08-06 PROCEDURE — 87206 SMEAR FLUORESCENT/ACID STAI: CPT

## 2020-08-06 PROCEDURE — 71045 X-RAY EXAM CHEST 1 VIEW: CPT

## 2020-08-06 PROCEDURE — 83880 ASSAY OF NATRIURETIC PEPTIDE: CPT

## 2020-08-06 PROCEDURE — 88305 TISSUE EXAM BY PATHOLOGIST: CPT

## 2020-08-06 PROCEDURE — 31625 BRONCHOSCOPY W/BIOPSY(S): CPT

## 2020-08-06 PROCEDURE — 99285 EMERGENCY DEPT VISIT HI MDM: CPT

## 2020-08-06 PROCEDURE — 87015 SPECIMEN INFECT AGNT CONCNTJ: CPT

## 2020-08-06 NOTE — EDPHYS
Physician Documentation                                                                           

 [facility]                                                                 

Name: Nery Gaffney                                                                            

Age: 64 yrs                                                                                       

Sex: Female                                                                                       

: 1955                                                                                   

MRN: [id number]                                                                                   

Arrival Date: 2020                                                                          

Time: 17:53                                                                                       

Account#: [id number]                                                                            

Bed 17                                                                                            

Private MD:                                                                                       

ED Physician Champ Garcia                                                                      

HPI:                                                                                              

                                                                                             

20:34 This 64 yrs old  Female presents to ER via Wheelchair with complaints of Chest mh7 

      Pain, Breathing Difficulty, Weakness.                                                       

20:34 The patient or guardian reports chest pain that is located primarily in the anterior    7 

      chest wall, bilaterally. Onset:.                                                            

20:34 Onset: 1 week(s) ago. The pain does not radiate. Associated signs and symptoms:         7 

      Pertinent positives: dizziness, shortness of breath, Pertinent negatives: abdominal         

      pain, cough, diaphoresis, headache, lower extremity pain, lower extremity swelling,         

      nausea, near syncope, palpitations, recent travel, syncope, vomiting. The chest pain is     

      described as aching, dull. Duration: The patient or guardian reports multiple episodes,     

      that are intermittent, that wax and wane, with no pattern. Modifying factors: The           

      symptoms are alleviated by remaining still, the symptoms are aggravated by exertion.        

      Severity of pain: At its worst the pain was moderate yesterday, in the emergency            

      department the pain has improved moderately. The patient has been recently seen at the      

      [facility] Emergency Department, last week.                          

                                                                                                  

Historical:                                                                                       

- Allergies:                                                                                      

18:05 HYDROCODONE;                                                                            ll1 

18:05 Iodine; IV contrast;                                                                    ll1 

- PMHx:                                                                                           

18:05 Depression; Hypertension; Hyperlipidemia; Diabetes - NIDDM; mediastinal mass;           ll1 

- PSHx:                                                                                           

18:05 Hernia repair; Hysterectomy; Cholecystectomy;                                           ll1 

                                                                                                  

- Immunization history:: Flu vaccine is not up to date.                                           

- Social history:: Smoking status: Patient/guardian denies using tobacco, Stopped _               

  months ago .1 Patient/guardian denies using alcohol, street drugs.                              

                                                                                                  

                                                                                                  

ROS:                                                                                              

20:34 Constitutional: Negative for fever, chills, and weight loss, Eyes: Negative for injury, mh7 

      pain, redness, and discharge, ENT: Negative for injury, pain, and discharge, Neck:          

      Negative for injury, pain, and swelling, Abdomen/GI: Negative for abdominal pain,           

      nausea, vomiting, diarrhea, and constipation, Back: Negative for injury and pain, :       

      Negative for injury, bleeding, discharge, and swelling, MS/Extremity: Negative for          

      injury and deformity, Skin: Negative for injury, rash, and discoloration, Neuro:            

      Negative for headache, weakness, numbness, tingling, and seizure, Psych: Negative for       

      depression, anxiety, suicide ideation, homicidal ideation, and hallucinations,              

      Allergy/Immunology: Negative for hives, rash, and allergies, Endocrine: Negative for        

      neck swelling, polydipsia, polyuria, polyphagia, and marked weight changes,                 

      Hematologic/Lymphatic: Negative for swollen nodes, abnormal bleeding, and unusual           

      bruising.                                                                                   

                                                                                                  

Exam:                                                                                             

20:34 Constitutional:  This is a well developed, well nourished patient who is awake, alert,  mh7 

      and in no acute distress. Head/Face:  Normocephalic, atraumatic. Eyes:  Pupils equal        

      round and reactive to light, extra-ocular motions intact.  Lids and lashes normal.          

      Conjunctiva and sclera are non-icteric and not injected.  Cornea within normal limits.      

      Periorbital areas with no swelling, redness, or edema. Neck:  Trachea midline, no           

      thyromegaly or masses palpated, and no cervical lymphadenopathy.  Supple, full range of     

      motion without nuchal rigidity, or vertebral point tenderness.  No Meningismus.             

      Chest/axilla:  Normal chest wall appearance and motion.  Nontender with no deformity.       

      No lesions are appreciated. Cardiovascular:  Regular rate and rhythm with a normal S1       

      and S2.  No gallops, murmurs, or rubs.  Normal PMI, no JVD.  No pulse deficits.             

20:34 Abdomen/GI:  Soft, non-tender, with normal bowel sounds.  No distension or tympany.  No     

      guarding or rebound.  No evidence of tenderness throughout. Back:  No spinal                

      tenderness.  No costovertebral tenderness.  Full range of motion. Skin:  Warm, dry with     

      normal turgor.  Normal color with no rashes, no lesions, and no evidence of cellulitis.     

      MS/ Extremity:  Pulses equal, no cyanosis.  Neurovascular intact.  Full, normal range       

      of motion. Neuro:  Awake and alert, GCS 15, oriented to person, place, time, and            

      situation.  Cranial nerves II-XII grossly intact.  Motor strength 5/5 in all                

      extremities.  Sensory grossly intact.  Cerebellar exam normal.  Normal gait. Psych:         

      Awake, alert, with orientation to person, place and time.  Behavior, mood, and affect       

      are within normal limits.                                                                   

20:34 Respiratory: mild respiratory distress is noted,  Respirations: normal, Breath sounds:      

      rhonchi, that are mild, are scattered, Respiratory rate:  22                                

                                                                                                  

Vital Signs:                                                                                      

18:00  / 58; Pulse 110; Resp 18; Temp 98.4; Pulse Ox 96% on R/A; Weight 56.7 kg; Height ll1 

      5 ft. 7 in. (170.18 cm); Pain 8/10;                                                         

20:23  / 62; Pulse 110; Resp 22; Pulse Ox 96% ;                                         ao  

18:00 Body Mass Index 19.58 (56.70 kg, 170.18 cm)                                             ll1 

                                                                                                  

MDM:                                                                                              

19:16 Patient medically screened.                                                             [facility] 

21:48 Differential diagnosis: acute myocardial infarction, acute pericarditis, anxiety,       [facility] 

      coronary artery disease chest wall pain, congestive heart failure costochondritis,          

      myocarditis, peptic ulcer disease, pleurisy, pneumonia, pneumothorax. HEART Score:          

      History: Moderately Suspicious (1), ECG: Non specific repolarization disturbance / LBTB     

      / PM (1), Age: > or = 65 years (2), Risk Factors: > or = 3 Risk factors for                 

      atherosclerotic disease (2), [Hypercholesterolemia] [Hypertension] [DM] Troponin: > 1       

      and < 3 x normal limit (1), Total Score = 7. The patient was given aspirin in the           

      Emergency Department. Data reviewed: vital signs, nurses notes, old medical records,        

      lab test result(s), cardiac enzymes, CBC, electrolytes, urinalysis, EKG, radiologic         

      studies, plain films. Data interpreted: Pulse oximetry: on room air is 96 %.                

      Interpretation: normal. Counseling: I had a detailed discussion with the patient and/or     

      guardian regarding: the historical points, exam findings, and any diagnostic results        

      supporting the discharge/admit diagnosis, lab results, radiology results, the need for      

      further work-up and treatment in the hospital.                                              

                                                                                                  

                                                                                             

19:16 Order name: Basic Metabolic Panel                                                       [facility] 

                                                                                             

19:16 Order name: CBC with Diff                                                               [facility] 

                                                                                             

19:16 Order name: LFT's                                                                       [facility] 

                                                                                             

19:16 Order name: Magnesium                                                                   [facility] 

                                                                                             

19:16 Order name: NT PRO-BNP                                                                  [facility] 

                                                                                             

19:16 Order name: PT-INR                                                                      [facility] 

                                                                                             

19:16 Order name: Troponin (emerg Dept Use Only)                                              [facility] 

                                                                                             

20:15 Order name: CBC with Automated Diff; Complete Time: 20:23                               [facility]

08                                                                                             

20:22 Order name: Protime (+INR); Complete Time: 20:23                                        [facility]

                                                                                             

20:53 Order name: Glucose, Ancillary Testing; Complete Time: 20:56                            [facility]

                                                                                             

20:54 Order name: Basic Metabolic Panel; Complete Time: 20:56                                 [facility]

                                                                                             

20:54 Order name: Liver (Hepatic) Function; Complete Time: 20:56                              [facility]

08                                                                                             

20:54 Order name: Troponin (Emerg Dept Use Only); Complete Time: 20:56                        [facility]

08                                                                                             

20:54 Order name: NT PRO-BNP; Complete Time: 20:56                                            [facility]

                                                                                             

19:16 Order name: XRAY Chest (1 view)                                                         [facility] 

                                                                                             

19:16 Order name: EKG; Complete Time: 19:17                                                   [facility] 

                                                                                             

19:16 Order name: Cardiac monitoring; Complete Time: 20:00                                    [facility] 

                                                                                             

19:16 Order name: EKG - Nurse/Tech; Complete Time: 20:00                                      [facility] 

                                                                                             

19:16 Order name: IV Saline Lock; Complete Time: 20:01                                        [facility] 

                                                                                             

19:16 Order name: Labs collected and sent; Complete Time: 20:00                               [facility] 

                                                                                             

19:16 Order name: O2 Per Protocol; Complete Time: 20:01                                       [facility] 

                                                                                             

19:16 Order name: O2 Sat Monitoring; Complete Time: 20:03                                     [facility] 

                                                                                             

20:10 Order name: RAD; Complete Time: 20:23                                                   [facility]

                                                                                             

20:54 Order name: Magnesium; Complete Time: 20:56                                             [facility]

                                                                                                  

Administered Medications:                                                                         

21:13 Drug: Potassium Chloride 40 mEq Route: PO;                                              ao  

23:00 Follow up: Response: No adverse reaction                                                ao  

21:14 Drug: Aspirin Chewable Tablet 324 mg Route: PO;                                         ao  

                                                                                             

01:19 Follow up: Response: No adverse reaction                                                ao  

                                                                                                  

                                                                                                  

Disposition:                                                                                      

05:32 Co-signature as Attending Physician, Champ Garcia MD.                                 [facility] 

                                                                                                  

Disposition:                                                                                      

20 21:51 Hospitalization ordered by Elmer Palomo for Observation. Preliminary             

  diagnosis are Chest Pain, Hypokalemia.                                                          

- Bed requested for Telemetry/MedSurg (observation).                                              

- Status is Observation.                                                                      bb  

- Condition is Stable.                                                                            

- Problem is new.                                                                                 

- Symptoms have improved.                                                                         

                                                                                                  

                                                                                                  

                                                                                                  

Signatures:                                                                                       

Dispatcher [employer]                           EDNini Locke, RN                     RN   Tayler Rios, NAEL NAYAK   cg                                                   

Harjinder Soto, RN                         Linda Mathew RN                       RN   ll1                                                  

Champ Garcia MD MD   7                                                  

                                                                                                  

Corrections: (The following items were deleted from the chart)                                    

                                                                                             

23:27 21:51 Hospitalization Ordered by Elmer Palomo for Observation. Preliminary diagnosis   cg  

      is Chest Pain; Hypokalemia. Bed requested for Telemetry/MedSurg (observation). Status       

      is Observation. Condition is Stable. Problem is new. Symptoms have improved. [facility]            

23:56 23:27 2020 21:51 Hospitalization Ordered by Elmer Palomo for Observation.        bb  

      Preliminary diagnosis is Chest Pain; Hypokalemia. Bed requested for Telemetry/MedSurg       

      (observation). Status is Observation. Condition is Stable. Problem is new. Symptoms         

      have improved. cg                                                                           

                                                                                                  

**************************************************************************************************

## 2020-08-06 NOTE — RAD REPORT
EXAM DESCRIPTION:  RAD - Chest Single View - 8/6/2020 7:48 pm

 

CLINICAL HISTORY:  SOB

Chest pain.

 

COMPARISON:  Chest Single View dated 7/30/2020; Chest Single View dated 1/29/2018; Chest Single View 
dated 1/28/2018; Chest Pa And Lat (2 Views) dated 10/30/2016; Thorax Wo Con dated 7/30/2020

 

FINDINGS:  Portable technique limits examination quality.

 

Bilateral interstitial lung opacities are present, unchanged. Small moderate left pleural effusion is
 also stable. The heart is mildly enlarged in size. No displaced fractures.

 

IMPRESSION:  Stable chest since 07/30/2020.

## 2020-08-06 NOTE — P.HP
Certification for Inpatient


Patient admitted to: Observation


With expected LOS: >2 Midnights


Practitioner: I am a practitioner with admitting privileges, knowledge of 

patient current condition, hospital course, and medical plan of care.


Services: Services provided to patient in accordance with Admission requirements

found in Title 42 Section 412.3 of the Code of Federal Regulations





Patient History


Date of Service: 08/06/20


Reason for admission: Chest pain


History of Present Illness: 


64-year-old woman with a history of hypertension, diabetes, former smoker, 

recently found to have the mediastinum mass 1 week ago presented emergency 

department with a complaint of chest pain of 1 week duration, which has gotten 

progressively worse.  She rated her pain at 8/10 in maximum severity.  Chest 

pain is worse with coughing and breathing.  Patient also reports some dizziness 

and weakness.  She denied any fever.  She endorsed cough productive of brownish 

sputum.  Her initial troponin is negative.  EKG demonstrates sinus tachycardia. 

Chest x-ray demonstrated possible loculated left pleural effusion and 

mediastinal mass.  Given her cardiac risk factors which include smoking, hyp

ertension, diabetes she is placed under observation for ACS rule out.





Allergies





iodine Allergy (Mild, Verified 01/28/18 21:52)


   Itching/Hives/Rash


hydrocodone Allergy (Verified 01/28/18 21:52)


   Unknown





Home Medications: 








Glimepiride 4 mg PO BID 06/13/13 


Lisinopril/Hydrochlorothiazide [Zestoretic 20-25 mg Tablet] 1 each PO DAILY 

06/13/13 


Metformin HCl [Glucophage*] 100 mg PO BID 06/13/13 


Simvastatin [Zocor*] 40 mg PO BEDTIME 06/13/13 


Trazodone HCl [Desyrel] 100 mg PO BEDTIME 06/13/13 


Cyclobenzaprine [Flexeril*] 10 mg PO DAILY 01/28/18 


Insulin Glargine,Hum.rec.anlog [Basaglar Kwikpen U-100] 15 unit SQ BID 01/28/18 








- Past Medical/Surgical History


Diabetic: Yes


-: DM


-: HTN


-: Hypercholesterolemia


-: Depression


-: Restless Leg syndrome


-: Gall bladder removed


-: Hysterectomy


-: Hernia


-: Hemoroids removed


-: Catarct Surgery





- Family History


  ** Father


-: Lung disease, Cancer





  ** Mother


-: Heart disease, Diabetes, Cancer





- Social History


Smoking Status: Former smoker


Alcohol use: No


CD- Drugs: No


Caffeine use: Yes





Review of Systems


Other: 


Except as documented, all other systems reviewed and negative.








Physical Examination





- Physical Exam


General: Alert, In no apparent distress, Cachectic


HEENT: Atraumatic, Normocephalic, Mucous membr. moist/pink


Neck: Supple, JVD not distended


Respiratory: Clear to auscultation bilaterally, Normal air movement


Cardiovascular: No edema, Normal S1 S2, Other (Tachycardia)


Capillary refill: <2 Seconds


Gastrointestinal: Normal bowel sounds, Soft and benign, Non-distended, No 

tenderness


Musculoskeletal: No swelling, No erythema


Integumentary: No rashes


Neurological: Normal strength at 5/5 x4 extr, Cranial nerves 3-12 intact





- Studies


Laboratory Data (last 24 hrs)





08/06/20 19:55: PT 13.1 H, INR 1.11


08/06/20 19:55: WBC 9.1  D, Hgb 12.8, Hct 37.8, Plt Count 373


08/06/20 19:55: Sodium 131 L, Potassium 2.6 L*, BUN 14, Creatinine 0.68, Glucose

112 H, Magnesium 1.5 L, Total Bilirubin 0.6, AST 28, ALT 17, Alkaline 

Phosphatase 95








Assessment and Plan





- Problems (Diagnosis)


(1) Mediastinal mass


Current Visit: Yes   Status: Acute   





(2) Diabetes mellitus type 2


Current Visit: No   Status: Active   





(3) Hypertensive disorder, systemic arterial


Onset Date: 01/29/18   Current Visit: No   Status: Active   





(4) Chest pain


Onset Date: 01/29/18   Current Visit: No   Status: Acute   





- Plan


I suspect chest pain is pleuritic and likely secondary to mediastinal mass and 

pleural effusion.  Given patient has high cardiac risk factors, need to rule out

ACS.


Place under observation.


Trend troponin


Start aspirin


Stress test pending troponin result.


Insulin sliding scale for glucose management.


Pulmonary consult to assess for bronchoscopy for tissue biopsy.





- Advance Directives


Does patient have a Living Will: No


Does patient have a Durable POA for Healthcare: No

## 2020-08-06 NOTE — ER
Nurse's Notes                                                                                     

 [facility]                                                                 

Name: Nery Gaffney                                                                            

Age: 64 yrs                                                                                       

Sex: Female                                                                                       

: 1955                                                                                   

MRN: [id number]                                                                                   

Arrival Date: 2020                                                                          

Time: 17:53                                                                                       

Account#: [id number]                                                                            

Bed 17                                                                                            

Private MD:                                                                                       

Diagnosis: Chest Pain;Hypokalemia                                                                 

                                                                                                  

Presentation:                                                                                     

                                                                                             

18:00 Chief complaint: Patient states: Diagnosed with mediastinal mass last week. Weakness,   ll1 

      dizzy, CP, SOB for 1 week getting worse. Cannot get into pulmonologist couldn't get her     

      in for 1 month. Daughter requests transfer to [facility]. Coronavirus screen: Client       

      denies travel out of the [place] in the last 14 days. The client reports previous COVID        

      testing was negative. Ebola Screen: Patient denies travel to an Ebola-affected area in      

      the 21 days before illness onset. Initial Sepsis Screen: Does the patient meet any 2        

      criteria? HR > 90 bpm. Risk Assessment: Do you want to hurt yourself or someone else?       

      Patient reports no desire to harm self or others. Onset of symptoms was 2020.      

18:00 Method Of Arrival: Wheelchair                                                           ll1 

18:00 Acuity: RANJEET 3                                                                           ll1 

20:05 Initial Sepsis Screen: Does the patient have a suspected source of infection? No.       ao  

      Patient's initial sepsis screen is negative.                                                

                                                                                                  

Historical:                                                                                       

- Allergies:                                                                                      

18:05 HYDROCODONE;                                                                            ll1 

18:05 Iodine; IV contrast;                                                                    ll1 

- PMHx:                                                                                           

18:05 Depression; Hypertension; Hyperlipidemia; Diabetes - NIDDM; mediastinal mass;           ll1 

- PSHx:                                                                                           

18:05 Hernia repair; Hysterectomy; Cholecystectomy;                                           ll1 

                                                                                                  

- Immunization history:: Flu vaccine is not up to date.                                           

- Social history:: Smoking status: Patient/guardian denies using tobacco, Stopped _               

  months ago .1 Patient/guardian denies using alcohol, street drugs.                              

                                                                                                  

                                                                                                  

Screenin:04 Abuse screen: Denies threats or abuse. Denies injuries from another. Nutritional        ao  

      screening: No deficits noted. Tuberculosis screening: No symptoms or risk factors           

      identified. Fall Risk None identified.                                                      

                                                                                                  

Assessment:                                                                                       

20:01 General: Appears in no apparent distress. comfortable, Behavior is calm, cooperative,   ao  

      appropriate for age. Pain: Denies pain. Pain does not radiate. Pain began 2-3 days ago.     

      Neuro: Level of Consciousness is awake, alert, obeys commands, Oriented to person,          

      place, time, situation, Appropriate for age Moves all extremities. Full function Speech     

      is normal, Facial symmetry appears normal. Cardiovascular: Capillary refill < 3 seconds     

      Patient's skin is warm and dry. Respiratory: Airway is patent Respiratory effort is         

      even, unlabored, Respiratory pattern is regular, symmetrical. GI: Abdomen is                

      non-distended. : No signs and/or symptoms were reported regarding the genitourinary       

      system. EENT: No signs and/or symptoms were reported regarding the EENT system. Derm:       

      Skin is intact, Skin is pink, warm \T\ dry. normal, Skin temperature is warm.               

      Musculoskeletal: Circulation, motion, and sensation intact. Range of motion: intact in      

      all extremities.                                                                            

22:28 Reassessment: please call pt's daughter Ade with updates on pt's condition [phone]        

      [phone].                                                                                   

23:38 Reassessment: Called [facility] to give nursing report as was told by charge nurse that     ao  

      nurse is not ready and will called back when ready.                                         

23:46 [facility]ass[facility]                                      ao  

                                                                                                  

Vital Signs:                                                                                      

18:00  / 58; Pulse 110; Resp 18; Temp 98.4; Pulse Ox 96% on R/A; Weight 56.7 kg; Height ll1 

      5 ft. 7 in. (170.18 cm); Pain 8/10;                                                         

20:23  / 62; Pulse 110; Resp 22; Pulse Ox 96% ;                                         ao  

18:00 Body Mass Index 19.58 (56.70 kg, 170.18 cm)                                             ll1 

                                                                                                  

ED Course:                                                                                        

17:53 Patient arrived in ED.                                                                  fj1 

18:04 Triage completed.                                                                       ll1 

18:05 Arm band placed on.                                                                     ll1 

19:01 Champ Garcia MD is Attending Physician.                                             [id number] 

19:31 Harjinder Soto, RN is Primary Nurse.                                                       ao  

20:00 Basic Metabolic Panel Sent.                                                             ao  

20:00 CBC with Diff Sent.                                                                     ao  

20:00 LFT's Sent.                                                                             ao  

20:00 Magnesium Sent.                                                                         ao  

20:00 NT PRO-BNP Sent.                                                                        ao  

20:00 PT-INR Sent.                                                                            ao  

20:00 Troponin (emerg Dept Use Only) Sent.                                                    ao  

20:05 Patient has correct armband on for positive identification. Cardiac monitor on. Pulse   ao  

      ox on. NIBP on.                                                                             

20:05 Inserted saline lock: 20 gauge in left antecubital area, using aseptic technique. Blood ao  

      collected. Patient maintains SpO2 saturation greater than 95% on room air.                  

21:50 Elmer Palomo is Hospitalizing Provider.                                               [facility] 

23:46 No provider procedures requiring assistance completed. Patient admitted, IV remains in  ao  

      place.                                                                                      

                                                                                                  

Administered Medications:                                                                         

21:13 Drug: Potassium Chloride 40 mEq Route: PO;                                              ao  

23:00 Follow up: Response: No adverse reaction                                                ao  

21:14 Drug: Aspirin Chewable Tablet 324 mg Route: PO;                                         ao  

                                                                                             

01:19 Follow up: Response: No adverse reaction                                                ao  

                                                                                                  

                                                                                                  

Outcome:                                                                                          

                                                                                             

21:51 Decision to Hospitalize by Provider.                                                    [facility] 

23:55 Admitted to Tele accompanied by tech, via wheelchair, room 223, with chart, Report      bb  

      called to  Gaby NAYAK                                                                         

23:55 Condition: stable                                                                           

23:55 Instructed on the need for admit.                                                           

23:56 Patient left the ED.                                                                    bb  

                                                                                                  

Signatures:                                                                                       

Nini Martinez RN                     RN   Harjinder Centeno RN                         RN   Jhonatan Young                                 fj1                                                  

Linda Molina RN                       RN   ll1                                                  

Champ Garcia MD MD   7                                                  

                                                                                                  

**************************************************************************************************

## 2020-08-07 LAB
BUN BLD-MCNC: 13 MG/DL (ref 7–18)
GLUCOSE SERPLBLD-MCNC: 175 MG/DL (ref 74–106)
HCT VFR BLD CALC: 35 % (ref 36–45)
HDLC SERPL-MCNC: 38 MG/DL (ref 40–60)
LDLC SERPL CALC-MCNC: 37 MG/DL (ref ?–130)
LYMPHOCYTES # SPEC AUTO: 1.1 K/UL (ref 0.7–4.9)
PMV BLD: 7.6 FL (ref 7.6–11.3)
POTASSIUM SERPL-SCNC: 3.1 MMOL/L (ref 3.5–5.1)
RBC # BLD: 3.77 M/UL (ref 3.86–4.86)
TROPONIN I: 0.05 NG/ML (ref 0–0.04)

## 2020-08-07 RX ADMIN — HUMAN INSULIN SCH: 100 INJECTION, SOLUTION SUBCUTANEOUS at 16:20

## 2020-08-07 RX ADMIN — Medication SCH ML: at 09:17

## 2020-08-07 RX ADMIN — HEPARIN SODIUM SCH UNIT: 5000 INJECTION, SOLUTION INTRAVENOUS; SUBCUTANEOUS at 09:21

## 2020-08-07 RX ADMIN — HUMAN INSULIN SCH UNIT: 100 INJECTION, SOLUTION SUBCUTANEOUS at 09:14

## 2020-08-07 RX ADMIN — POTASSIUM CHLORIDE SCH: 200 INJECTION, SOLUTION INTRAVENOUS at 11:00

## 2020-08-07 RX ADMIN — ASPIRIN SCH MG: 81 TABLET, COATED ORAL at 09:17

## 2020-08-07 RX ADMIN — HEPARIN SODIUM SCH UNIT: 5000 INJECTION, SOLUTION INTRAVENOUS; SUBCUTANEOUS at 20:57

## 2020-08-07 RX ADMIN — METOPROLOL TARTRATE SCH MG: 50 TABLET, FILM COATED ORAL at 20:56

## 2020-08-07 RX ADMIN — ARFORMOTEROL TARTRATE SCH: 15 SOLUTION RESPIRATORY (INHALATION) at 10:53

## 2020-08-07 RX ADMIN — ARFORMOTEROL TARTRATE SCH MCG: 15 SOLUTION RESPIRATORY (INHALATION) at 19:45

## 2020-08-07 RX ADMIN — HUMAN INSULIN SCH UNIT: 100 INJECTION, SOLUTION SUBCUTANEOUS at 20:55

## 2020-08-07 RX ADMIN — METOPROLOL TARTRATE SCH: 50 TABLET, FILM COATED ORAL at 09:00

## 2020-08-07 RX ADMIN — Medication SCH ML: at 21:01

## 2020-08-07 RX ADMIN — HEPARIN SODIUM SCH UNIT: 5000 INJECTION, SOLUTION INTRAVENOUS; SUBCUTANEOUS at 01:12

## 2020-08-07 RX ADMIN — HUMAN INSULIN SCH UNIT: 100 INJECTION, SOLUTION SUBCUTANEOUS at 12:15

## 2020-08-07 RX ADMIN — NICOTINE SCH MG: 21 PATCH TRANSDERMAL at 09:15

## 2020-08-07 RX ADMIN — METOPROLOL TARTRATE SCH MG: 50 TABLET, FILM COATED ORAL at 12:06

## 2020-08-07 RX ADMIN — POTASSIUM CHLORIDE SCH: 200 INJECTION, SOLUTION INTRAVENOUS at 09:00

## 2020-08-07 RX ADMIN — POTASSIUM CHLORIDE SCH MLS: 200 INJECTION, SOLUTION INTRAVENOUS at 09:16

## 2020-08-07 NOTE — P.PN
Subjective


Date of Service: 08/07/20


Chief Complaint: Chest pain


Patient states her chest pain has  improved though still experiencing a little 

bit on the left anterior chest wall.  Troponin stable at 0.05.  Low suspicion 

for ACS.








Physical Examination





- Vital Signs


Temperature: 97 F


Blood Pressure: 124/59


Pulse: 106


Respirations: 16


Pulse Ox (%): 96





- Physical Exam


General: Alert, In no apparent distress


Neck: Supple, JVD not distended


Respiratory: Clear to auscultation bilaterally, Normal air movement


Cardiovascular: No edema, Regular rate/rhythm, Normal S1 S2


Gastrointestinal: Normal bowel sounds, Soft and benign, No tenderness


Musculoskeletal: No swelling, No erythema


Integumentary: No rashes


Neurological: Normal strength at 5/5 x4 extr





- Studies


Laboratory Data (last 24 hrs)





08/06/20 19:55: PT 13.1 H, INR 1.11


08/06/20 19:55: WBC 9.1  D, Hgb 12.8, Hct 37.8, Plt Count 373


08/06/20 19:55: Sodium 131 L, Potassium 2.6 L*, BUN 14, Creatinine 0.68, Glucose

112 H, Magnesium 1.5 L, Total Bilirubin 0.6, AST 28, ALT 17, Alkaline 

Phosphatase 95








Assessment And Plan





- Current Problems (Diagnosis)


(1) Mediastinal mass


Current Visit: Yes   Status: Acute   





(2) Diabetes mellitus type 2


Current Visit: No   Status: Active   





(3) Hypertensive disorder, systemic arterial


Onset Date: 01/29/18   Current Visit: No   Status: Active   





(4) Chest pain


Onset Date: 01/29/18   Current Visit: No   Status: Acute   





- Plan


I suspect chest pain is pleuritic and likely secondary to mediastinal mass and 

pleural effusion.  Troponin trended flat and low suspicion for ACS. 


Nuclear stress test today given patient high cardiac risk factors.


Consult to cardiology


Contain aspirin


Patient to be seen by pulmonary.


Start empiric Levaquin.


Insulin sliding scale for glucose management.

## 2020-08-07 NOTE — EKG
Test Date:    2020-08-06               Test Time:    19:50:03

Technician:   RITCHIE                                     

                                                     

MEASUREMENT RESULTS:                                       

Intervals:                                           

Rate:         110                                    

DC:           154                                    

QRSD:         90                                     

QT:           350                                    

QTc:          473                                    

Axis:                                                

P:            69                                     

DC:           154                                    

QRS:          -10                                    

T:            103                                    

                                                     

INTERPRETIVE STATEMENTS:                                       

                                                     

Sinus tachycardia

Possible Left atrial enlargement

Low voltage QRS

Septal infarct, age undetermined

Abnormal ECG

Compared to ECG 01/29/2018 07:12:32

Low QRS voltage now present

Myocardial infarct finding now present

Sinus rhythm no longer present



Electronically Signed On 08-07-20 15:17:01 CDT by Tony Mae

## 2020-08-07 NOTE — P.CNS
Date of Consult: 08/07/20


Reason for Consult: Lung cancer


Chief Complaint: Chest pain


History of Present Illness: 





Patient is 64-year-old lady admitted from the emergency room with left-sided 

chest pain she came here about a week ago was informed that she a lung cancer 

she has a large mediastinal mass on the left side adjacent to the sternum lost 

about 20 lb in weight a pain started about a week ago heavy smoker


Allergies





iodine Allergy (Mild, Verified 01/28/18 21:52)


   Itching/Hives/Rash


hydrocodone Allergy (Verified 01/28/18 21:52)


   Unknown





Home Medications: 








Glimepiride 4 mg PO BID 06/13/13 


Lisinopril/Hydrochlorothiazide [Zestoretic 20-25 mg Tablet] 1 each PO DAILY 

06/13/13 


Metformin HCl [Glucophage*] 1,000 mg PO BID 06/13/13 


Simvastatin [Zocor*] 40 mg PO BEDTIME 06/13/13 


Cyclobenzaprine [Flexeril*] 10 mg PO BEDTIME 01/28/18 


Insulin Glargine,Hum.rec.anlog [Basaglar Kwikpen U-100] 20 unit SQ BID 01/28/18 


Folic Acid 1 tab PO DAILY 08/07/20 


Gabapentin [Neurontin*] 200 mg PO BEDTIME 08/07/20 


Methotrexate [Methotrexate*] 8 cap PO SEECOM 08/07/20 








- Past Medical/Surgical History


Diabetic: Yes


-: DM


-: HTN


-: Hypercholesterolemia


-: Depression


-: Restless Leg syndrome


-: Gall bladder removed


-: Hysterectomy


-: Hernia


-: Hemoroids removed


-: Catarct Surgery


-: appendectomy





- Family History


  ** Father


Medical History: Lung disease, Cancer


Notes: colon ca





  ** Mother


Medical History: Heart disease, Diabetes, Cancer


Notes: uterine





- Social History


Smoking Status: Current every day smoker


Alcohol use: No


CD- Drugs: No


Caffeine use: Yes


Place of Residence: Home





Review of Systems


General: Weakness, As per HPI


Respiratory: Cough, Shortness of Breath


Cardiovascular: Chest Pain





Physical Examination














Temp Pulse Resp BP Pulse Ox


 


 97 F   106 H  16   124/59 L  96 


 


 08/07/20 09:01  08/07/20 09:01  08/07/20 09:01  08/07/20 09:01  08/07/20 09:01








General: Alert, Oriented x3, Mild distress


Respiratory: Clear to auscultation bilaterally, Diminished


Cardiovascular: No edema, Regular rate/rhythm, Normal S1 S2


Gastrointestinal: Normal bowel sounds, Soft and benign


Laboratory Data (last 24 hrs)





08/06/20 19:55: PT 13.1 H, INR 1.11


08/06/20 19:55: WBC 9.1  D, Hgb 12.8, Hct 37.8, Plt Count 373


08/06/20 19:55: Sodium 131 L, Potassium 2.6 L*, BUN 14, Creatinine 0.68, Glucose

112 H, Magnesium 1.5 L, Total Bilirubin 0.6, AST 28, ALT 17, Alkaline 

Phosphatase 95








- Problems


(1) Lung cancer


Current Visit: Yes   Status: Acute   


Plan: 


Patient is 64 years of age admitted with chest pain she has a large left-sided 

lung mass with left-sided pleural effusion very close to the chest wall patient 

has advanced stage lung cancer will plan for pain control on bronchoscopy on 

Monday discuss with the patient the risk include bleeding infection and lung 

collapse also added steroids bronchodilators in pain relief saturation 

satisfactory 


Qualifiers: 


   Laterality: left

## 2020-08-08 LAB
BUN BLD-MCNC: 17 MG/DL (ref 7–18)
GLUCOSE SERPLBLD-MCNC: 180 MG/DL (ref 74–106)
POTASSIUM SERPL-SCNC: 4 MMOL/L (ref 3.5–5.1)

## 2020-08-08 RX ADMIN — HEPARIN SODIUM SCH UNIT: 5000 INJECTION, SOLUTION INTRAVENOUS; SUBCUTANEOUS at 08:39

## 2020-08-08 RX ADMIN — Medication SCH ML: at 08:39

## 2020-08-08 RX ADMIN — ARFORMOTEROL TARTRATE SCH: 15 SOLUTION RESPIRATORY (INHALATION) at 08:00

## 2020-08-08 RX ADMIN — HUMAN INSULIN SCH UNIT: 100 INJECTION, SOLUTION SUBCUTANEOUS at 12:14

## 2020-08-08 RX ADMIN — HEPARIN SODIUM SCH UNIT: 5000 INJECTION, SOLUTION INTRAVENOUS; SUBCUTANEOUS at 20:50

## 2020-08-08 RX ADMIN — HUMAN INSULIN SCH: 100 INJECTION, SOLUTION SUBCUTANEOUS at 07:30

## 2020-08-08 RX ADMIN — NICOTINE SCH MG: 21 PATCH TRANSDERMAL at 08:38

## 2020-08-08 RX ADMIN — ASPIRIN SCH MG: 81 TABLET, COATED ORAL at 08:39

## 2020-08-08 RX ADMIN — ARFORMOTEROL TARTRATE SCH MCG: 15 SOLUTION RESPIRATORY (INHALATION) at 20:10

## 2020-08-08 RX ADMIN — HUMAN INSULIN SCH UNIT: 100 INJECTION, SOLUTION SUBCUTANEOUS at 20:50

## 2020-08-08 RX ADMIN — METOPROLOL TARTRATE SCH MG: 50 TABLET, FILM COATED ORAL at 20:49

## 2020-08-08 RX ADMIN — HUMAN INSULIN SCH UNIT: 100 INJECTION, SOLUTION SUBCUTANEOUS at 17:44

## 2020-08-08 RX ADMIN — Medication SCH ML: at 20:50

## 2020-08-08 RX ADMIN — METOPROLOL TARTRATE SCH MG: 50 TABLET, FILM COATED ORAL at 08:38

## 2020-08-08 RX ADMIN — SODIUM CHLORIDE SCH MLS: 0.9 INJECTION, SOLUTION INTRAVENOUS at 12:14

## 2020-08-08 NOTE — CON
Date of Consultation:  08/07/2020



Reason For Consultation:  Chest pain, weakness, and shortness of breath.



History Of Present Illness:  Ms. Gaffney is a 64-year-old woman.  She has a history of lung cancer, m
ediastinal mass, hypertension, diabetes, dyslipidemia, and depression.  She came in with symptoms of 
weakness and shortness of breath.  Her chest pain was more midepigastric, left lateral pleuritic type
 without any nausea, vomiting, diaphoresis, PND, orthopnea, pedal edema, palpitations, or syncope.  S
he denied fever or chills or cough.  By the time I saw her, she was noted to have a troponin of 0.05 
x2, not consistent with acute coronary syndrome.



Allergies:  TO IODINE, __________.



Review of Systems:

Negative.



Social History:  Negative.



Family History:  Noncontributory.



Medications:  At home include Zocor, methotrexate, metformin, lisinopril with hydrochlorothiazide, in
sulin, and glimepiride.



Physical Examination:

Vital Signs:  Stable.  She was afebrile. 

HEENT:  Negative. 

Neck:  Supple without any bruit, lymphadenopathy, JVD, or thyromegaly. 

Chest:  Clear to auscultation and percussion. 

Cardiac:  Revealed a regular rhythm and rate.  No murmurs, gallops, or rubs. 

Abdomen:  Benign. 

Extremities:  Revealed no clubbing, cyanosis, or edema.



Diagnostic Data:  Other than the troponin were unremarkable.



Impression And Plan:  Atypical chest pain, most likely secondary to her lung cancer, possible pleuris
y.  An echocardiogram was ordered, but could not be done at this time.  Stress test was ordered and i
t is pending.  We will see what that shows before making final decisions.  She does have multiple ris
k factors for heart disease including hypertension, diabetes, and dyslipidemia.  She is also allergic
 to iodine, so if her stress test is positive, we will have to address that issue now.  If her stress
 test is negative as far as I am concerned, she can go home and follow up with  __________ as an o
utpatient.





NB/JONNIE

DD:  08/08/2020 06:29:12Voice ID:  [id number]

DT:  08/08/2020 07:17:49Report ID:  [id number]

## 2020-08-08 NOTE — P.PN
Subjective


Date of Service: 08/08/20


Chief Complaint: Chest pain


Patient denies any chest pain.  She reports loss of appetite and decreased oral 

intake.  She has no fever.








Physical Examination





- Vital Signs


Temperature: 96.9 F


Blood Pressure: 112/55


Pulse: 108


Respirations: 18


Pulse Ox (%): 95





- Physical Exam


General: Alert, In no apparent distress


Neck: Supple


Respiratory: Clear to auscultation bilaterally, Normal air movement


Cardiovascular: No edema, Regular rate/rhythm, Normal S1 S2


Gastrointestinal: Normal bowel sounds, Soft and benign, No tenderness


Musculoskeletal: No swelling, No erythema


Integumentary: No rashes


Neurological: Other (Nonfocal)





Assessment And Plan





- Current Problems (Diagnosis)


(1) Mediastinal mass


Current Visit: Yes   Status: Acute   





(2) Diabetes mellitus type 2


Current Visit: No   Status: Active   





(3) Hypertensive disorder, systemic arterial


Onset Date: 01/29/18   Current Visit: No   Status: Active   





(4) Chest pain


Onset Date: 01/29/18   Current Visit: No   Status: Acute   





- Plan


I suspect chest pain is pleuritic and likely secondary to mediastinal mass and 

pleural effusion.  Troponin trended flat and low suspicion for ACS. 


Nuclear stress test was not available yesterday.  It can be done as an 

outpatient.


Patient seen by pulmonary-Dr. Downey and planned for bronchoscopy on monday.


Hold aspirin in anticipation for bronchoscopy


Continue empiric Levaquin.


Insulin sliding scale for glucose management.


Encourage oral intake.

## 2020-08-09 RX ADMIN — SODIUM CHLORIDE SCH MLS: 0.9 INJECTION, SOLUTION INTRAVENOUS at 08:13

## 2020-08-09 RX ADMIN — HUMAN INSULIN SCH: 100 INJECTION, SOLUTION SUBCUTANEOUS at 11:30

## 2020-08-09 RX ADMIN — ARFORMOTEROL TARTRATE SCH MCG: 15 SOLUTION RESPIRATORY (INHALATION) at 19:55

## 2020-08-09 RX ADMIN — METOPROLOL TARTRATE SCH MG: 50 TABLET, FILM COATED ORAL at 21:58

## 2020-08-09 RX ADMIN — HEPARIN SODIUM SCH UNIT: 5000 INJECTION, SOLUTION INTRAVENOUS; SUBCUTANEOUS at 08:09

## 2020-08-09 RX ADMIN — Medication SCH: at 21:00

## 2020-08-09 RX ADMIN — ARFORMOTEROL TARTRATE SCH MCG: 15 SOLUTION RESPIRATORY (INHALATION) at 08:15

## 2020-08-09 RX ADMIN — NICOTINE SCH MG: 21 PATCH TRANSDERMAL at 08:13

## 2020-08-09 RX ADMIN — HUMAN INSULIN SCH UNIT: 100 INJECTION, SOLUTION SUBCUTANEOUS at 22:04

## 2020-08-09 RX ADMIN — METOPROLOL TARTRATE SCH MG: 50 TABLET, FILM COATED ORAL at 08:08

## 2020-08-09 RX ADMIN — Medication SCH ML: at 08:14

## 2020-08-09 RX ADMIN — HUMAN INSULIN SCH UNIT: 100 INJECTION, SOLUTION SUBCUTANEOUS at 08:07

## 2020-08-09 RX ADMIN — HUMAN INSULIN SCH UNIT: 100 INJECTION, SOLUTION SUBCUTANEOUS at 16:58

## 2020-08-09 NOTE — P.PN
Subjective


Date of Service: 08/09/20


Chief Complaint: Chest pain


Patient denies any chest pain.  She reports fatigue and reduced oral intake.








Physical Examination





- Vital Signs


Temperature: 96.8 F


Blood Pressure: 126/74


Pulse: 100


Respirations: 18


Pulse Ox (%): 97





- Physical Exam


General: Alert, In no apparent distress


HEENT: Mucous membr. moist/pink


Respiratory: Clear to auscultation bilaterally, Normal air movement


Cardiovascular: No edema, Regular rate/rhythm, Normal S1 S2


Gastrointestinal: Normal bowel sounds, Soft and benign, Non-distended


Integumentary: No rashes


Neurological: Other (Nonfocal)





Assessment And Plan





- Current Problems (Diagnosis)


(1) Mediastinal mass


Current Visit: Yes   Status: Acute   





(2) Diabetes mellitus type 2


Current Visit: No   Status: Active   





(3) Hypertensive disorder, systemic arterial


Onset Date: 01/29/18   Current Visit: No   Status: Active   





(4) Chest pain


Onset Date: 01/29/18   Current Visit: No   Status: Acute   





- Plan


I suspect chest pain is pleuritic and likely secondary to mediastinal mass and 

pleural effusion.  Troponin trended flat and low suspicion for ACS. 


Nuclear stress test was not available yesterday.  Cardiology input appreciated. 

Stress test to be done tomorrow.


Patient seen by pulmonary-Dr. Downey and planned for bronchoscopy also 

tomorrow.


Hold aspirin in anticipation for bronchoscopy


Continue empiric Levaquin.


Insulin sliding scale for glucose management.


Encourage oral intake.

## 2020-08-10 VITALS — OXYGEN SATURATION: 99 %

## 2020-08-10 VITALS — DIASTOLIC BLOOD PRESSURE: 79 MMHG | TEMPERATURE: 97.6 F | SYSTOLIC BLOOD PRESSURE: 142 MMHG

## 2020-08-10 LAB
BLD SMEAR INTERP: (no result)
HCT VFR BLD CALC: 36 % (ref 36–45)
LYMPHOCYTES # SPEC AUTO: 0.5 K/UL (ref 0.7–4.9)
MORPHOLOGY BLD-IMP: (no result)
PMV BLD: 7.6 FL (ref 7.6–11.3)
RBC # BLD: 3.82 M/UL (ref 3.86–4.86)

## 2020-08-10 PROCEDURE — 0BBB8ZX EXCISION OF LEFT LOWER LOBE BRONCHUS, VIA NATURAL OR ARTIFICIAL OPENING ENDOSCOPIC, DIAGNOSTIC: ICD-10-PCS

## 2020-08-10 RX ADMIN — HUMAN INSULIN SCH: 100 INJECTION, SOLUTION SUBCUTANEOUS at 07:30

## 2020-08-10 RX ADMIN — HUMAN INSULIN SCH UNIT: 100 INJECTION, SOLUTION SUBCUTANEOUS at 17:36

## 2020-08-10 RX ADMIN — METOPROLOL TARTRATE SCH: 50 TABLET, FILM COATED ORAL at 08:15

## 2020-08-10 RX ADMIN — SODIUM CHLORIDE SCH MLS: 0.9 INJECTION, SOLUTION INTRAVENOUS at 04:33

## 2020-08-10 RX ADMIN — ARFORMOTEROL TARTRATE SCH MCG: 15 SOLUTION RESPIRATORY (INHALATION) at 08:25

## 2020-08-10 RX ADMIN — LIDOCAINE HYDROCHLORIDE ONE APPL: 40 SOLUTION TOPICAL at 11:35

## 2020-08-10 RX ADMIN — SODIUM CHLORIDE SCH: 0.9 INJECTION, SOLUTION INTRAVENOUS at 01:00

## 2020-08-10 RX ADMIN — HUMAN INSULIN SCH: 100 INJECTION, SOLUTION SUBCUTANEOUS at 11:30

## 2020-08-10 RX ADMIN — Medication SCH ML: at 16:40

## 2020-08-10 RX ADMIN — NICOTINE SCH: 21 PATCH TRANSDERMAL at 08:15

## 2020-08-10 RX ADMIN — LIDOCAINE HYDROCHLORIDE ONE APPL: 40 SOLUTION TOPICAL at 11:52

## 2020-08-10 NOTE — RAD REPORT
EXAM DESCRIPTION:  MRI - Brain W/Wo Cont - 8/10/2020 5:05 pm

 

CLINICAL HISTORY:  r/o cancer/mets, headache

 

COMPARISON:  Chest Single View dated 8/10/2020

 

TECHNIQUE:  Sagittal and axial T1-weighted images were obtained. Axial PD/heavily T2-weighted and T2-
FLAIR images were obtained along with axial DWI/ADC mapping sequences.  Coronal heavily T2 weighted s
equence obtained.  Axial and coronal post-contrast T1-weighted images were also obtained. A 12 ml Mul
tihance contrast following utilized.

 

FINDINGS:  No acute infarction changes are present. No hemorrhage, mass effect or edema. No midline s
hift. No extra-axial fluid collections. Baseline atrophy and chronic ischemic changes are very minima
l. Signal voids are seen as a normal finding in the major intracranial vessels.

 

A 7 millimeter enhancing focus is present in the superior aspect of the left cerebellum near the tent
orium. A 7 millimeter enhancing lesion is present in the anterior right thalamus near the genu of the
 internal capsule. A 5 mm enhancing lesion is present in the inferior right cerebral temporal occipit
al junction. There are a few additional small punctate areas of enhancement in the cerebral hemispher
es that are difficult to distinguish from small vessels.

 

Mastoid air cells and paranasal sinuses are clear.

 

No sella or supra sella abnormality. No globe or orbital content abnormality.

 

IMPRESSION:  Patient has at least 3 small 7 mm or less size enhancing lesions in the cerebral and cer
ebellar hemispheres.

 

Intracranial metastatic disease is the most likely etiology.

## 2020-08-10 NOTE — RAD REPORT
EXAM DESCRIPTION:  RAD - FLUORO-GUIDE FOR BRONCH UPT1HR - 8/10/2020 1:17 pm

 

FINDINGS:  A single image from fluoroscopic assisted bronchoscopy was submitted. No suspicious or une
xpected finding.

 

Fluoro time was 3.3 minutes with a 34.9 mGy cumulative dose.

## 2020-08-10 NOTE — RAD REPORT
EXAM DESCRIPTION:  CT - Head Brain Wo Cont - 8/10/2020 2:43 pm

 

CLINICAL HISTORY:  brain mets, history of iodinated contrast allergy

 

COMPARISON:  No comparisons

 

TECHNIQUE:  Axial 5 mm thick images of the head were obtained without IV contrast.

 

All CT scans are performed using dose optimization technique as appropriate and may include automated
 exposure control or mA/KV adjustment according to patient size.

 

FINDINGS:  No intracranial hemorrhage, mass, edema or shift of mid-line structures. No acute infarcti
on changes seen. No abnormal extra-axial fluid collections. Ventricles are normal. Atrophy and chroni
c ischemic changes are mild.

 

Mastoid air cells and visualized portions of the paranasal sinuses are clear.

 

No acute bony findings.

 

Available history indicates the patient declined premedication. No IV contrast was administered due t
o allergy.

 

IMPRESSION:  Mild atrophy and chronic ischemic change. No evidence for metastatic disease or other ac
sully finding.

## 2020-08-10 NOTE — P.DS
Discharge Date: 08/10/20


Disposition: ROUTINE DISCHARGE


Discharge Condition: GOOD


Reason for Admission: Chest pain


Consultations: 





Pulmonary


Brief History of Present Illness: 





64-year-old woman with a history of hypertension, diabetes, former smoker, 

recently found to have the mediastinum mass 1 week ago presented emergency 

department with a complaint of chest pain of 1 week duration, which has gotten 

progressively worse.  She rated her pain at 8/10 in maximum severity.  Chest 

pain is worse with coughing and breathing.  Patient also reports some dizziness 

and weakness.  She denied any fever.  She endorsed cough productive of brownish 

sputum.  Her initial troponin is negative.  EKG demonstrates sinus tachycardia. 

Chest x-ray demonstrated possible loculated left pleural effusion and 

mediastinal mass.  Given her cardiac risk factors which include smoking, 

hypertension, diabetes she is placed under observation for ACS rule out.


Hospital Course: 





Patient had bronchoscopy performed. This revealed small cell cancer. patient 

will continue with outpatient follow with pulmonary and also oncology. At this 

time, patient is stable for discharge with close outpatient followup with 

specialist as mentioned above. 


Vital Signs/Physical Exam: 














Temp Pulse Resp BP Pulse Ox


 


 97.6 F   99 H  20   142/79 H  99 


 


 08/10/20 16:00  08/10/20 16:00  08/10/20 16:00  08/10/20 16:00  08/10/20 16:00








General: Alert, In no apparent distress, Oriented x3


Laboratory Data at Discharge: 














WBC  13.0 K/uL (4.3-10.9)  H D 08/10/20  15:23    


 


Hgb  11.7 g/dL (12.0-15.0)  L  08/10/20  15:23    


 


Hct  36.0 % (36.0-45.0)   08/10/20  15:23    


 


Plt Count  414 K/uL (152-406)  H D 08/10/20  15:23    


 


PT  13.1 SECONDS (9.5-12.5)  H  08/06/20  19:55    


 


INR  1.11   08/06/20  19:55    


 


Sodium  135 mmol/L (136-145)  L  08/08/20  05:30    


 


Potassium  4.0 mmol/L (3.5-5.1)   08/08/20  05:30    


 


BUN  17 mg/dL (7-18)   08/08/20  05:30    


 


Creatinine  0.65 mg/dL (0.55-1.3)   08/08/20  05:30    


 


Glucose  180 mg/dL ()  H  08/08/20  05:30    


 


Magnesium  1.5 mg/dL (1.8-2.4)  L  08/06/20  19:55    


 


Total Bilirubin  0.6 mg/dL (0.2-1.0)   08/06/20  19:55    


 


AST  28 U/L (15-37)   08/06/20  19:55    


 


ALT  17 U/L (12-78)   08/06/20  19:55    


 


Alkaline Phosphatase  95 U/L ()   08/06/20  19:55    


 


Troponin I  0.05 ng/mL (0.0-0.045)  H  08/07/20  05:45    


 


Triglycerides  120 mg/dL (<150)   08/07/20  00:58    


 


Cholesterol  99 mg/dL (<200)   08/07/20  00:58    


 


HDL Cholesterol  38 mg/dL (40-60)  L  08/07/20  00:58    


 


Cholesterol/HDL Ratio  2.61   08/07/20  00:58    








Home Medications: 








Glimepiride 4 mg PO BID 06/13/13 


Lisinopril/Hydrochlorothiazide [Zestoretic 20-25 mg Tablet] 1 each PO DAILY 

06/13/13 


Metformin HCl [Glucophage*] 1,000 mg PO BID 06/13/13 


Simvastatin [Zocor*] 40 mg PO BEDTIME 06/13/13 


Cyclobenzaprine [Flexeril*] 10 mg PO BEDTIME 01/28/18 


Insulin Glargine,Hum.rec.anlog [Basaglteresa Jefferypen U-100] 20 unit SQ BID 01/28/18 


Folic Acid 1 tab PO DAILY 08/07/20 


Gabapentin [Neurontin*] 200 mg PO BEDTIME 08/07/20 


Methotrexate [Methotrexate*] 8 cap PO SEECOM 08/07/20 


Tramadol HCl [Ultram] 50 mg PO Q6HP PRN #40 tablet 08/10/20 





New Medications: 


Tramadol HCl [Ultram] 50 mg PO Q6HP PRN #40 tablet


 PRN Reason: Pain Scale 5-7 (Moderate)


Patient Discharge Instructions: OK TO DC IV AND DC HOME.  FOLLOW-UP WITH PRIMARY

 CARE PROVIDER IN 1-2 WEEKS.  FOLLOW-UP WITH PULMONARY IN 1-2 WEEKS.  FOLLOW-UP 

WITH ONCOLOGY IN 1-2 WEEKS.  RETURN TO THE ER IF symptoms worsen.  CALL or TEXT 

DR. AG [phone] IF ANY QUESTIONS REGARDING HOSPITAL STAY.  PLEASE CALL 

THE FLOOR [phone] IF ANY MEDICATION OR NURSING QUESTIONS.


Diet: ADA


Activity: Fall precautions


Followup: 


Raul Downey MD [ACTIVE - CAN ADMIT] - 1-2 Weeks


(pulmonologist- call to schedule an appointment


___________________________________________________________)


Buck Pandey MD [Primary Care Provider] - 1-2 Weeks


(PCP- call to schedule an appointment


__________________________________________________________)


Time spent managing pt's care (in minutes): 30

## 2020-08-10 NOTE — RAD REPORT
EXAM DESCRIPTION:  RAD - Chest Single View - 8/10/2020 1:59 pm

 

CLINICAL HISTORY:  S/P BRONCHOSCOPY; R/O PNEUMO

 

COMPARISON:  August 6

 

TECHNIQUE:  AP portable chest image was obtained 8/10/2020 1:59 pm .

 

FINDINGS:  Post bronchoscopy chest film shows no pneumothorax. Extensive pleural and parenchymal opac
ification filled the lower half of the left chest. Patient has extensive overall interstitial opacifi
cation. Cardiomegaly is present.

 

IMPRESSION:  No post bronchoscopy pneumothorax.

## 2020-08-19 NOTE — P.OP
Date of Service: 08/10/20 (Bronchoscopy with endobronchial BX , BAL and wire 

brush)








Findings and Operative Technique





Pt is 64 yrs of age AW chest and foung lung mass ane mediastinal adenopathy


After obtaining and informed consent from the aptient, premedicated by 

anesthesia.


Finding- Normal vocal cords, carni,normal r side anatomey . Left side extensive 

tumor infiltration of LLL. Multiple specimes sent as above . Pt tolerated 

procedue well. MRI- multiple small mets. Stage 4 cancer

## 2020-08-24 ENCOUNTER — HOSPITAL ENCOUNTER (INPATIENT)
Dept: HOSPITAL 97 - ER | Age: 65
LOS: 4 days | Discharge: HOME | DRG: 180 | End: 2020-08-28
Payer: COMMERCIAL

## 2020-08-24 VITALS — BODY MASS INDEX: 19.5 KG/M2

## 2020-08-24 DIAGNOSIS — F17.210: ICD-10-CM

## 2020-08-24 DIAGNOSIS — C79.31: ICD-10-CM

## 2020-08-24 DIAGNOSIS — J93.9: ICD-10-CM

## 2020-08-24 DIAGNOSIS — I47.1: ICD-10-CM

## 2020-08-24 DIAGNOSIS — E43: ICD-10-CM

## 2020-08-24 DIAGNOSIS — E11.9: ICD-10-CM

## 2020-08-24 DIAGNOSIS — Z79.899: ICD-10-CM

## 2020-08-24 DIAGNOSIS — R06.02: ICD-10-CM

## 2020-08-24 DIAGNOSIS — Z91.09: ICD-10-CM

## 2020-08-24 DIAGNOSIS — E78.5: ICD-10-CM

## 2020-08-24 DIAGNOSIS — J91.0: ICD-10-CM

## 2020-08-24 DIAGNOSIS — Z20.828: ICD-10-CM

## 2020-08-24 DIAGNOSIS — I10: ICD-10-CM

## 2020-08-24 DIAGNOSIS — Z90.49: ICD-10-CM

## 2020-08-24 DIAGNOSIS — Z88.5: ICD-10-CM

## 2020-08-24 DIAGNOSIS — Z85.118: ICD-10-CM

## 2020-08-24 DIAGNOSIS — Z79.84: ICD-10-CM

## 2020-08-24 DIAGNOSIS — C34.92: Primary | ICD-10-CM

## 2020-08-24 DIAGNOSIS — Z90.710: ICD-10-CM

## 2020-08-24 LAB
ALBUMIN SERPL BCP-MCNC: 2.7 G/DL (ref 3.4–5)
ALP SERPL-CCNC: 108 U/L (ref 45–117)
ALT SERPL W P-5'-P-CCNC: 19 U/L (ref 12–78)
AST SERPL W P-5'-P-CCNC: 32 U/L (ref 15–37)
BUN BLD-MCNC: 26 MG/DL (ref 7–18)
CKMB CREATINE KINASE MB: 4.8 NG/ML (ref 0.3–3.6)
GLUCOSE SERPLBLD-MCNC: 169 MG/DL (ref 74–106)
HCT VFR BLD CALC: 40.4 % (ref 36–45)
INR BLD: 1.04
LIPASE SERPL-CCNC: 52 U/L (ref 73–393)
LYMPHOCYTES # SPEC AUTO: 0.7 K/UL (ref 0.7–4.9)
MAGNESIUM SERPL-MCNC: 1.5 MG/DL (ref 1.8–2.4)
MORPHOLOGY BLD-IMP: (no result)
NT-PROBNP SERPL-MCNC: 1263 PG/ML (ref ?–125)
PMV BLD: 7.5 FL (ref 7.6–11.3)
POTASSIUM SERPL-SCNC: 4.8 MMOL/L (ref 3.5–5.1)
RBC # BLD: 4.31 M/UL (ref 3.86–4.86)
TROPONIN (EMERG DEPT USE ONLY): 0.02 NG/ML (ref 0–0.04)

## 2020-08-24 PROCEDURE — 77300 RADIATION THERAPY DOSE PLAN: CPT

## 2020-08-24 PROCEDURE — 88108 CYTOPATH CONCENTRATE TECH: CPT

## 2020-08-24 PROCEDURE — 81015 MICROSCOPIC EXAM OF URINE: CPT

## 2020-08-24 PROCEDURE — 85610 PROTHROMBIN TIME: CPT

## 2020-08-24 PROCEDURE — 82947 ASSAY GLUCOSE BLOOD QUANT: CPT

## 2020-08-24 PROCEDURE — 89050 BODY FLUID CELL COUNT: CPT

## 2020-08-24 PROCEDURE — 71045 X-RAY EXAM CHEST 1 VIEW: CPT

## 2020-08-24 PROCEDURE — 71250 CT THORAX DX C-: CPT

## 2020-08-24 PROCEDURE — 85730 THROMBOPLASTIN TIME PARTIAL: CPT

## 2020-08-24 PROCEDURE — 77280 THER RAD SIMULAJ FIELD SMPL: CPT

## 2020-08-24 PROCEDURE — 96372 THER/PROPH/DIAG INJ SC/IM: CPT

## 2020-08-24 PROCEDURE — 87206 SMEAR FLUORESCENT/ACID STAI: CPT

## 2020-08-24 PROCEDURE — 85379 FIBRIN DEGRADATION QUANT: CPT

## 2020-08-24 PROCEDURE — 99203 OFFICE O/P NEW LOW 30 MIN: CPT

## 2020-08-24 PROCEDURE — 81003 URINALYSIS AUTO W/O SCOPE: CPT

## 2020-08-24 PROCEDURE — 80076 HEPATIC FUNCTION PANEL: CPT

## 2020-08-24 PROCEDURE — 82550 ASSAY OF CK (CPK): CPT

## 2020-08-24 PROCEDURE — 77290 THER RAD SIMULAJ FIELD CPLX: CPT

## 2020-08-24 PROCEDURE — 83880 ASSAY OF NATRIURETIC PEPTIDE: CPT

## 2020-08-24 PROCEDURE — 84100 ASSAY OF PHOSPHORUS: CPT

## 2020-08-24 PROCEDURE — 83605 ASSAY OF LACTIC ACID: CPT

## 2020-08-24 PROCEDURE — 77295 3-D RADIOTHERAPY PLAN: CPT

## 2020-08-24 PROCEDURE — 36415 COLL VENOUS BLD VENIPUNCTURE: CPT

## 2020-08-24 PROCEDURE — 87116 MYCOBACTERIA CULTURE: CPT

## 2020-08-24 PROCEDURE — 80048 BASIC METABOLIC PNL TOTAL CA: CPT

## 2020-08-24 PROCEDURE — 83690 ASSAY OF LIPASE: CPT

## 2020-08-24 PROCEDURE — 87040 BLOOD CULTURE FOR BACTERIA: CPT

## 2020-08-24 PROCEDURE — 94760 N-INVAS EAR/PLS OXIMETRY 1: CPT

## 2020-08-24 PROCEDURE — 88305 TISSUE EXAM BY PATHOLOGIST: CPT

## 2020-08-24 PROCEDURE — 85025 COMPLETE CBC W/AUTO DIFF WBC: CPT

## 2020-08-24 PROCEDURE — 99285 EMERGENCY DEPT VISIT HI MDM: CPT

## 2020-08-24 PROCEDURE — 87070 CULTURE OTHR SPECIMN AEROBIC: CPT

## 2020-08-24 PROCEDURE — 85027 COMPLETE CBC AUTOMATED: CPT

## 2020-08-24 PROCEDURE — 83615 LACTATE (LD) (LDH) ENZYME: CPT

## 2020-08-24 PROCEDURE — 83735 ASSAY OF MAGNESIUM: CPT

## 2020-08-24 PROCEDURE — 93306 TTE W/DOPPLER COMPLETE: CPT

## 2020-08-24 PROCEDURE — 94640 AIRWAY INHALATION TREATMENT: CPT

## 2020-08-24 PROCEDURE — 77334 RADIATION TREATMENT AID(S): CPT

## 2020-08-24 PROCEDURE — 93005 ELECTROCARDIOGRAM TRACING: CPT

## 2020-08-24 PROCEDURE — 84484 ASSAY OF TROPONIN QUANT: CPT

## 2020-08-24 PROCEDURE — 82553 CREATINE MB FRACTION: CPT

## 2020-08-24 PROCEDURE — 84155 ASSAY OF PROTEIN SERUM: CPT

## 2020-08-24 PROCEDURE — 87015 SPECIMEN INFECT AGNT CONCNTJ: CPT

## 2020-08-24 NOTE — ER
Nurse's Notes                                                                                     

 [facility]                                                                 

Name: Nery Gaffney                                                                            

Age: 64 yrs                                                                                       

Sex: Female                                                                                       

: 1955                                                                                   

MRN: [id number]                                                                                   

Arrival Date: 2020                                                                          

Time: 18:56                                                                                       

Account#: [id number]                                                                            

Bed 6                                                                                             

Private MD:                                                                                       

Diagnosis: Pneumonia, unspecified organism;Tachycardia, unspecified;Hypoxemia                     

                                                                                                  

Presentation:                                                                                     

                                                                                             

18:58 Chief complaint: EMS states: Pt's daughter called EMS for difficulty breathing, upon      

      arrival -250, respiratory rate 32, 15 mg Cardizem administered, HR down to 120,       

      BP initially 60/40, last /80, 300 mL NS given, pt reports chest pain and              

      palpitations when HR was elevated, denies pain at this time, recently dx w/ Stage 4         

      lung cancer and daughter reports that she has been taken off of all of her BP and heart     

      meds, pt denies hx of irregular HR. Coronavirus screen: Client denies travel out of the     

      [place] in the last 14 days. At this time, the client does not indicate any symptoms           

      associated with coronavirus-19. Ebola Screen: No symptoms or risks identified at this       

      time. Initial Sepsis Screen: Does the patient meet any 2 criteria?. Risk Assessment: Do     

      you want to hurt yourself or someone else? Patient reports no desire to harm self or        

      others. Onset of symptoms was 2020.                                              

18:58 Method Of Arrival: EMS: [facility]  

18:58 Acuity: RANJEET 2                                                                           ph  

21:21 Initial Sepsis Screen: Does the patient have a suspected source of infection? Yes:      lp1 

      Productive cough/pneumonia.                                                                 

                                                                                                  

Triage Assessment:                                                                                

19:30 Respiratory: Reports shortness of breath Onset: The symptoms/episode began/occurred     lp1 

      gradually, the patient has moderate shortness of breath.                                    

                                                                                                  

Historical:                                                                                       

- Allergies:                                                                                      

19:13 HYDROCODONE;                                                                            ph  

19:13 Iodine; IV contrast;                                                                    ph  

- Home Meds:                                                                                      

19:13 metformin 1,000 mg Oral tab 1 tab 2 times per day for Type 2 Diabetes Mellitus          ph  

      [Active]; Folic Acid Oral [Active]; Dexamethasone Oral [Active]; gabapentin oral oral       

      [Active]; cyclobenzaprine Oral [Active]; Tramadol Oral [Active];                            

- PMHx:                                                                                           

19:13 Depression; Diabetes - NIDDM; Hyperlipidemia; Hypertension; mediastinal mass; Cancer,   ph  

      Lung;                                                                                       

- PSHx:                                                                                           

19:13 Hernia repair; Hysterectomy; Cholecystectomy;                                           ph  

                                                                                                  

- Immunization history:: Adult Immunizations unknown.                                             

- Social history:: Smoking status: Patient reports the use of cigarette tobacco                   

  products, smokes two packs cigarettes per day.                                                  

                                                                                                  

                                                                                                  

Screenin:14 Abuse screen: Denies threats or abuse. Denies injuries from another. Nutritional        ph  

      screening: No deficits noted. Tuberculosis screening: No symptoms or risk factors           

      identified. Fall Risk None identified.                                                      

                                                                                                  

Assessment:                                                                                       

20:08 General: Appears in no apparent distress. ill, Behavior is appropriate for age. Pain:   lp1 

      Denies pain. Neuro: Level of Consciousness is awake, alert, obeys commands, Oriented to     

      person, place, situation. Cardiovascular: Capillary refill < 3 seconds in bilateral         

      fingers Rhythm is sinus tachycardia. Respiratory: Airway is patent Respiratory effort       

      is labored, Respiratory pattern is tachypnea Breath sounds are diminished bilaterally.      

      GI: No signs and/or symptoms were reported involving the gastrointestinal system. :       

      No signs and/or symptoms were reported regarding the genitourinary system. EENT: No         

      signs and/or symptoms were reported regarding the EENT system. Derm: Skin is thin, Skin     

      is dry, Skin is pale. Musculoskeletal: No deficits noted.                                   

21:00 Reassessment: Patient appears in no apparent distress at this time. Patient readjusted  lp1 

      for comfort in bed; Aware of pending admission.                                             

22:03 Reassessment: Reassessment: Patient appears in no apparent distress at this time.       lp1 

      Patient sitting up for comfort on breathing.                                                

23:45 Reassessment: Patient complaint of nausea; Provider notified.                           lp1 

                                                                                                  

Vital Signs:                                                                                      

18:58 BP 99 / 63; Pulse 118; Resp 32; Temp 97.2; Pulse Ox 94% on R/A; Weight 54.43 kg; Height ph  

      5 ft. 6 in. (167.64 cm);                                                                    

19:15  / 63; Pulse 117; Resp 25; Pulse Ox 100% on 2 lpm NC;                             lp1 

19:45  / 65; Pulse 119; Resp 24; Pulse Ox 99% on 2 lpm NC;                              lp1 

20:30 BP 99 / 67; Pulse 122; Resp 20; Pulse Ox 98% on 2 lpm NC;                               lp1 

21:15  / 65; Pulse 118; Resp 26; Pulse Ox 97% on 2 lpm NC;                              lp1 

22:45 Temp 97.9(O);                                                                           lp1 

23:45  / 69; Pulse 114; Resp 24; Pulse Ox 99% on 2 lpm NC;                              lp1 

18:58 Body Mass Index 19.37 (54.43 kg, 167.64 cm)                                             ph  

                                                                                                  

ED Course:                                                                                        

18:56 Patient arrived in ED.                                                                  ph  

19:01 Beck Silva PA is PHCP.                                                              [facility] 

19:01 Abdiel Ortega MD is Attending Physician.                                              [facility] 

19:09 Triage completed.                                                                       ph  

19:14 Zbigniew Hayes MD is Attending Physician.                                            tw4 

19:14 Arm band placed on Patient placed in an exam room, on a stretcher, on oxygen, on        ph  

      cardiac monitor, on pulse oximetry.                                                         

19:30 Patient has correct armband on for positive identification. Placed in gown. Bed in low  lp1 

      position. Call light in reach. Side rails up X2. Cardiac monitor on. Pulse ox on. NIBP      

      on.                                                                                         

19:43 Escobedo, Cinthya, RN is Primary Nurse.                                                       lp1 

20:00 Maintain EMS IV. Dressing intact. Good blood return noted. Site clean \T\ dry. Gauge \T\    lp
1

      site: 20g to L AC.                                                                          

20:02 XRAY CXR (1 view) In Process Unspecified.                                               EDMS

20:45 Dao Wu PA is Hospitalizing Provider.                                        tw4 

21:21 No provider procedures requiring assistance completed. Patient admitted, IV remains in  lp1 

      place.                                                                                      

21:30 20g IV to L AC from EMS infiltrated at this time; IV DC'd.                              lp1 

21:48 Hospitalizing Provider role handed off by Dao Wu PA                         tw4 

21:48 Yann Dumont MD is Hospitalizing Provider.                                           tw4 

22:11 CT Chest Wo Con In Process Unspecified.                                                 EDMS

22:30 Inserted 18 gauge 10 cm midline to right upper arm basilic vein on first attempt. Line  fc  

      with good blood return and flushes well.                                                    

22:30 COVID swab at this time.                                                                lp1 

                                                                                                  

Administered Medications:                                                                         

20:38 Drug: Zofran (Ondansetron) 4 mg Route: IVP; Site: left antecubital;                     mt2 

21:30 Follow up: Response: No adverse reaction                                                lp1 

21:17 Drug: Cefepime 1 grams Route: IVPB; Rate: 200 ml/hr; Infused Over: 30 mins; Site: left  lp1 

      antecubital;                                                                                

22:45 Follow up: IV Status: Completed infusion                                                lp1 

22:45 Drug: AZITHromycin 500 mg Route: IVPB; Infused Over: 1 hrs; Site: right upper arm;      lp1 

23:53 Follow up: IV Status: Completed infusion                                                lp1 

23:54 Drug: Lovenox 1 mg/kg Route: Sub-Q; Site: right lower abdomen;                          lp1 

                                                                                             

00:19 Follow up: Response: No adverse reaction                                                lp1 

                                                                                             

23:54 Drug: Phenergan 12.5 mg Route: IVP; Site: right upper arm;                              lp1 

                                                                                             

00:18 Follow up: Response: No adverse reaction                                                lp1 

                                                                                                  

                                                                                                  

Outcome:                                                                                          

                                                                                             

20:46 Decision to Hospitalize by Provider.                                                    tw4 

22:35 Condition: stable                                                                       lp1 

22:35 Instructed on the need for admit.                                                           

                                                                                             

00:09 Admitted to Tele via stretcher, room 428, with oxygen, with chart, Report called to     madie Dunbar RN                                                                                   

00:19 Patient left the ED.                                                                    1 

                                                                                                  

Signatures:                                                                                       

Dispatcher [employer]                           EDMS                                                 

Beck Silva PA PA jmm Chretien, Felicia RN                   RN   Cinthya Dalton RN                         RN   1                                                  

Nery Velázquez RN RN ph Wadley, Terrence, MD MD   tw4                                                  

Mary Ann Dao RN                    RN   mt2                                                  

                                                                                                  

Corrections: (The following items were deleted from the chart)                                    

                                                                                             

23:55 22:03 Reassessment: 1                                                                 1 

                                                                                                  

**************************************************************************************************

## 2020-08-24 NOTE — P.HP
Certification for Inpatient


Patient admitted to: Observation


With expected LOS: <2 Midnights


Patient will require the following post-hospital care: None


Practitioner: I am a practitioner with admitting privileges, knowledge of 

patient current condition, hospital course, and medical plan of care.


Services: Services provided to patient in accordance with Admission requirements

found in Title 42 Section 412.3 of the Code of Federal Regulations





<Dao uW - Last Filed: 08/24/20 23:30>





Patient History


Date of Service: 08/24/20


Reason for admission: Pleural effusion/hypoxia


History of Present Illness: 





64-year-old female with a past medical history of diabetes, hypertension, 

depression and low stage IV lung cancer presents to the emergency room with 

complaints of worsening difficulty breathing.  Patient states that for the past 

month she has had increasing difficulty breathing.  Feels like she cannot catch 

her breath.  Daughter called EMS.  On arrival, patient's heart rate was 2 20-

250, respiratory rate of 32 an initial blood pressure of 60/40.  By the time 

patient presented to the emergency room her blood pressure had improved to 

120/80, was given some gentle IV fluids in route and 15 mg of Cardizem.





Patient was recently diagnosed with stage IV lung cancer.  She was scheduled for

a PET scan in the next day or 2.  In the emergency room chest x-ray shows no 

significant change in the left lung opacity, mediastinal and hilar 

lymphadenopathy and left pleural effusion.  CT scan of the chest pending.  Lab 

work fairly unremarkable.  Her last set of vitals shows a blood pressure of 

99/63, pulse rate of 118, respiratory rate of 32, temperature 97.2 and a pulse 

oximeter of 94% on room air.  She has lost a lot a weight in the recent few 

months.  She is alert and oriented and in no distress. Has a mild labored 

breathing.  Patient will be placed in observation and further evaluated.  

Patient wants to be DNR.








Home medications list reviewed: No





- Past Medical/Surgical History


Diabetic: Yes


-: DM


-: HTN


-: Hypercholesterolemia


-: Depression


-: Restless Leg syndrome


-: Gall bladder removed


-: Hysterectomy


-: Hernia


-: Hemoroids removed


-: Catarct Surgery


-: appendectomy


Psychosocial/ Personal History: Lives at home





- Family History


  ** Father


-: Lung disease, Cancer


Notes: colon ca





  ** Mother


-: Heart disease, Diabetes, Cancer


Notes: uterine





- Social History


Smoking Status: Former smoker


Alcohol use: No


CD- Drugs: No


Caffeine use: Yes


Place of Residence: Home





<Dao Wu - Last Filed: 08/24/20 23:30>


Date of Service: 08/27/20





<DumontYann - Last Filed: 08/27/20 20:38>


Allergies





iodine Allergy (Mild, Verified 01/28/18 21:52)


   Itching/Hives/Rash


hydrocodone Allergy (Verified 08/25/20 01:12)


   Unknown





Home Medications: 








Cyclobenzaprine [Flexeril*] 1 tab PO BEDTIME 08/26/20 


Ergocalciferol (Vitamin D2) [Vitamin D2] 1 tab PO SEECOM 08/26/20 


Folic Acid 1 tab PO DAILY 08/26/20 


Gabapentin 2 tab PO BEDTIME 08/26/20 


Metformin HCl 500 mg PO BID 08/26/20 


Tramadol HCl [Ultram] 1 tab PO BID PRN 08/26/20 


dexAMETHasone [Dexamethasone] 2 mg PO TID 08/26/20 








Review of Systems


General: As per HPI


Eyes: Unremarkable


ENT: Unremarkable


Respiratory: Shortness of Breath, SOB with Excertion


Cardiovascular: Unremarkable


Gastrointestinal: Unremarkable


Genitourinary: Unremarkable


Musculoskeletal: Unremarkable


Integumentary: Unremarkable


Neurological: Unremarkable


Lymphatics: Unremarkable





<Dao Wu - Last Filed: 08/24/20 23:30>





Physical Examination





- Vital Signs


Temperature: 97.2 F


Blood Pressure: 99/63


Pulse: 118


Respirations: 32


Pulse Ox (%): 94 (RA)





- Physical Exam


General: Alert, In no apparent distress, Oriented x3


HEENT: Atraumatic, Normocephalic, PERRLA


Neck: Supple, Other (Trachea midline)


Respiratory: Diminished, Other (Mildly labored breathing)


Cardiovascular: No edema, Normal pulses


Capillary refill: <2 Seconds


Gastrointestinal: Normal bowel sounds, Soft and benign, Non-distended


Musculoskeletal: No swelling, No contractures, No erythema


Integumentary: No breakdown, No significant lesion, No tenderness/swelling


Neurological: Normal gait, Normal speech, Normal strength at 5/5 x4 extr, Normal

 tone





- Studies


Laboratory Data (last 24 hrs)





08/24/20 22:24: PT 12.3, INR 1.04, APTT 26.6


08/24/20 22:24: WBC 14.1 H, Hgb 13.1, Hct 40.4, Plt Count 436 H


08/24/20 22:24: Sodium 137, Potassium 4.8, BUN 26 H, Creatinine 0.65, Glucose 

169 H, Magnesium 1.5 L, Total Bilirubin 0.5, AST 32, ALT 19, Alkaline 

Phosphatase 108, Lipase 52 L








<Dao Wu - Last Filed: 08/24/20 23:30>





Assessment and Plan





- Plan





Impression:


Large left lung pleural effusion complicated by recent diagnosis of stage IV 

lung cancer:


Type 2 diabetes mellitus:


Severe malnutrition:





Plan:


Large left lung pleural effusion complicated by recent diagnosis of stage IV 

lung cancer:  Patient was recently diagnosed with stage IV lung cancer.  She is 

followed outpatient by her oncologist.  Patient was scheduled for PET scan.  

Patient is requesting thoracentesis to help with fluid buildup in the left lower

 lung.  She be placed on telemetry.  Patient is DNR. 


Type 2 diabetes mellitus:  Accu-Cheks a.c. HS.  Diabetic diet.  Will place on 

sliding scale insulin.


Severe malnutrition:  Significant weight loss in the last few months likely 

secondary to recent diagnosis of stage IV lung cancer.





Discharge Plan: Home


Plan to discharge in: 48 Hours





- Advance Directives


Does patient have a Living Will: No


Does patient have a Durable POA for Healthcare: No





- Code Status/Comfort Care


Code Status Assessed: Yes


Code Status: Do Not Attempt Resuscitat


Time Spent Managing Pts Care (In Minutes): 55





<Dao Wu - Last Filed: 08/24/20 23:30>


Physician Review Additional Text: 





I reviewed the plan of care for this patient on 8/24/20 by Dao Wu, and

 I agree with the management as noted above.





<Yann Dumont - Last Filed: 08/27/20 20:38>

## 2020-08-24 NOTE — EDPHYS
Physician Documentation                                                                           

 [facility]                                                                 

Name: Nery Gaffney                                                                            

Age: 64 yrs                                                                                       

Sex: Female                                                                                       

: 1955                                                                                   

MRN: [id number]                                                                                   

Arrival Date: 2020                                                                          

Time: 18:56                                                                                       

Account#: [id number]                                                                            

Bed 6                                                                                             

Private MD:                                                                                       

ED Physician Zbigniew Hayes                                                                     

HPI:                                                                                              

                                                                                             

23:46 This 64 yrs old  Female presents to ER via EMS with complaints of Breathing    tw4 

      Difficulty.                                                                                 

23:46 The patient has shortness of breath at rest. Onset: The symptoms/episode began/occurred tw4 

      today. Duration: The symptoms are continuous, and are unchanged since they started. The     

      patient's shortness of breath has no apparent modifying factors. Associated signs and       

      symptoms: The patient has no apparent associated signs or symptoms. Severity of             

      symptoms: At their worst the symptoms were moderate in the emergency department the         

      symptoms are unchanged. The patient has not experienced similar symptoms in the past.       

                                                                                                  

Historical:                                                                                       

- Allergies:                                                                                      

19:13 HYDROCODONE;                                                                            ph  

19:13 Iodine; IV contrast;                                                                    ph  

- Home Meds:                                                                                      

19:13 metformin 1,000 mg Oral tab 1 tab 2 times per day for Type 2 Diabetes Mellitus          ph  

      [Active]; Folic Acid Oral [Active]; Dexamethasone Oral [Active]; gabapentin oral oral       

      [Active]; cyclobenzaprine Oral [Active]; Tramadol Oral [Active];                            

- PMHx:                                                                                           

19:13 Depression; Diabetes - NIDDM; Hyperlipidemia; Hypertension; mediastinal mass; Cancer,   ph  

      Lung;                                                                                       

- PSHx:                                                                                           

19:13 Hernia repair; Hysterectomy; Cholecystectomy;                                           ph  

                                                                                                  

- Immunization history:: Adult Immunizations unknown.                                             

- Social history:: Smoking status: Patient reports the use of cigarette tobacco                   

  products, smokes two packs cigarettes per day.                                                  

                                                                                                  

                                                                                                  

ROS:                                                                                              

23:46 Constitutional: Negative for fever, chills, and weight loss, Eyes: Negative for injury, tw4 

      pain, redness, and discharge, Cardiovascular: Negative for chest pain, palpitations,        

      and edema, Abdomen/GI: Negative for abdominal pain, nausea, vomiting, diarrhea, and         

      constipation, Back: Negative for injury and pain, MS/Extremity: Negative for injury and     

      deformity, Skin: Negative for injury, rash, and discoloration, Neuro: Negative for          

      headache, weakness, numbness, tingling, and seizure.                                        

23:46 Respiratory: Positive for cough, shortness of breath, Negative for dyspnea on exertion,     

      hemoptysis, orthopnea, pleurisy.                                                            

                                                                                                  

Exam:                                                                                             

23:46 Constitutional:  This is a well developed, well nourished patient who is awake, alert,  tw4 

      and in no acute distress. Head/Face:  Normocephalic, atraumatic. Chest/axilla:  Normal      

      chest wall appearance and motion.  Nontender with no deformity.  No lesions are             

      appreciated. Cardiovascular:  Regular rate and rhythm with a normal S1 and S2.  No          

      gallops, murmurs, or rubs.  Normal PMI, no JVD.  No pulse deficits. Abdomen/GI:  Soft,      

      non-tender, with normal bowel sounds.  No distension or tympany.  No guarding or            

      rebound.  No evidence of tenderness throughout. Back:  No spinal tenderness.  No            

      costovertebral tenderness.  Full range of motion. Skin:  Warm, dry with normal turgor.      

      Normal color with no rashes, no lesions, and no evidence of cellulitis. MS/ Extremity:      

      Pulses equal, no cyanosis.  Neurovascular intact.  Full, normal range of motion. Neuro:     

       Awake and alert, GCS 15, oriented to person, place, time, and situation.  Cranial          

      nerves II-XII grossly intact.  Motor strength 5/5 in all extremities.  Sensory grossly      

      intact.  Cerebellar exam normal.  Normal gait.                                              

23:46 Respiratory: the patient does not display signs of respiratory distress,  Respirations:     

      normal, Breath sounds: decreased breath sounds, that are moderate, are heard in the         

      left posterior lower lobe.                                                                  

                                                                                                  

Vital Signs:                                                                                      

18:58 BP 99 / 63; Pulse 118; Resp 32; Temp 97.2; Pulse Ox 94% on R/A; Weight 54.43 kg; Height ph  

      5 ft. 6 in. (167.64 cm);                                                                    

19:15  / 63; Pulse 117; Resp 25; Pulse Ox 100% on 2 lpm NC;                             lp1 

19:45  / 65; Pulse 119; Resp 24; Pulse Ox 99% on 2 lpm NC;                              lp1 

20:30 BP 99 / 67; Pulse 122; Resp 20; Pulse Ox 98% on 2 lpm NC;                               lp1 

21:15  / 65; Pulse 118; Resp 26; Pulse Ox 97% on 2 lpm NC;                              lp1 

22:45 Temp 97.9(O);                                                                           lp1 

23:45  / 69; Pulse 114; Resp 24; Pulse Ox 99% on 2 lpm NC;                              lp1 

18:58 Body Mass Index 19.37 (54.43 kg, 167.64 cm)                                             ph  

                                                                                                  

MDM:                                                                                              

19:14 Patient medically screened.                                                             tw4 

23:48 Antibiotic administration: cefepime, zithromax. Data reviewed: vital signs, nurses      tw4 

      notes. Data reviewed: lab test result(s), cardiac enzymes, CBC, electrolytes, hepatic       

      panel, radiologic studies, plain films. Data interpreted: Pulse oximetry: Plan: O2 by       

      NC applied. Test interpretation: by ED physician or midlevel provider: ECG, plain           

      radiologic studies. Counseling: I had a detailed discussion with the patient and/or         

      guardian regarding: the historical points, exam findings, and any diagnostic results        

      supporting the discharge/admit diagnosis, lab results, radiology results, the need for      

      further work-up and treatment in the hospital. Physician consultation: Dao TALAVERA regarding admission, to the telemetry unit. and will see patient in inpatient room.      

                                                                                                  

                                                                                             

19:17 Order name: Blood Culture Adult (2)                                                     tw 

                                                                                             

19:17 Order name: BMP                                                                         tw 

                                                                                             

19:17 Order name: CBC with Diff                                                               tw4 

                                                                                             

19:17 Order name: Ckmb                                                                        tw 

                                                                                             

19:17 Order name: CPK                                                                         tw 

                                                                                             

19:17 Order name: D-Dimer                                                                     tw 

                                                                                             

19:17 Order name: Hepatic Function                                                            tw4 

                                                                                             

19:17 Order name: Lipase                                                                      tw 

                                                                                             

19:17 Order name: Magnesium                                                                   tw 

                                                                                             

19:17 Order name: NT PRO-BNP                                                                  tw 

                                                                                             

19:17 Order name: PT-INR                                                                      tw 

                                                                                             

19:17 Order name: Ptt, Activated                                                              tw 

                                                                                             

19:17 Order name: Troponin (emerg Dept Use Only)                                              tw 

                                                                                             

20:06 Order name: Lactate                                                                     1 

                                                                                             

19:17 Order name: XRAY CXR (1 view)                                                           tw 

                                                                                             

21:43 Order name: CT Chest Wo Con                                                             tw4 

                                                                                             

22:51 Order name: Manual Differential                                                         EDMS

                                                                                             

23:30 Order name: Urinalysis                                                                  EDMS

                                                                                             

23:30 Order name: Basic Metabolic Panel                                                       EDMS

                                                                                             

23:30 Order name: Basic Metabolic Panel                                                       EDMS

                                                                                             

23:30 Order name: CBC with Automated Diff                                                     EDMS

                                                                                             

23:30 Order name: CBC with Automated Diff                                                     EDMS

                                                                                             

23:30 Order name: Magnesium                                                                   EDMS

                                                                                             

23:30 Order name: Magnesium                                                                   EDMS

                                                                                             

23:41 Order name: SARS-COV-2 RT PCR                                                           EDMS

                                                                                             

19:17 Order name: EKG; Complete Time: 19:18                                                   tw4 

                                                                                             

19:17 Order name: Cardiac monitoring; Complete Time: 19:43                                    tw4 

                                                                                             

19:17 Order name: EKG - Nurse/Tech; Complete Time: 20:06                                      tw4 

                                                                                             

19:17 Order name: IV Saline Lock; Complete Time: 20:06                                        tw4 

                                                                                             

19:17 Order name: Labs collected and sent; Complete Time: 20:06                               tw4 

                                                                                             

19:17 Order name: O2 Per Protocol; Complete Time: 19:43                                       tw4 

                                                                                             

19:17 Order name: O2 Sat Monitoring; Complete Time: 19:43                                     tw4 

                                                                                             

23:29 Order name: Consistent Carb (ADA) 1800 Lm                                              EDMS

                                                                                                  

EC/25                                                                                             

06:41 Rate is 121 beats/min. Rhythm is regular. QRS Axis is Normal. QRS interval is normal.   tw4 

      QT interval is normal. No Q waves. T waves are Inverted in leads I, aVL. No ST changes      

      noted. Clinical impression: Sinus tachycardia. Interpreted by me. Reviewed by me.           

                                                                                                  

Administered Medications:                                                                         

                                                                                             

20:38 Drug: Zofran (Ondansetron) 4 mg Route: IVP; Site: left antecubital;                     mt2 

21:30 Follow up: Response: No adverse reaction                                                lp1 

21:17 Drug: Cefepime 1 grams Route: IVPB; Rate: 200 ml/hr; Infused Over: 30 mins; Site: left  lp1 

      antecubital;                                                                                

22:45 Follow up: IV Status: Completed infusion                                                lp1 

22:45 Drug: AZITHromycin 500 mg Route: IVPB; Infused Over: 1 hrs; Site: right upper arm;      lp1 

23:53 Follow up: IV Status: Completed infusion                                                lp1 

23:54 Drug: Lovenox 1 mg/kg Route: Sub-Q; Site: right lower abdomen;                          lp1 

                                                                                             

00:19 Follow up: Response: No adverse reaction                                                lp1 

                                                                                             

23:54 Drug: Phenergan 12.5 mg Route: IVP; Site: right upper arm;                              lp1 

                                                                                             

00:18 Follow up: Response: No adverse reaction                                                lp1 

                                                                                                  

                                                                                                  

Disposition:                                                                                      

20 20:46 Hospitalization ordered by Yann Dumont for Inpatient Admission. Preliminary     

  diagnosis are Pneumonia, unspecified organism, Tachycardia, unspecified,                        

  Hypoxemia.                                                                                      

- Bed requested for Telemetry/MedSurg (Inpatient).                                                

- Status is Inpatient Admission.                                                              lp1 

- Condition is Stable.                                                                            

- Problem is new.                                                                                 

- Symptoms have improved.                                                                         

                                                                                                  

                                                                                                  

                                                                                                  

Signatures:                                                                                       

Dispatcher [employer]                           EDMS                                                 

Cinthya Escobedo RN RN   lp1                                                  

Nery Velázquez RN                      RN                                                      

Tayler Aponte RN                       RN                                                      

Zbigniew Hayes MD MD   tw4                                                  

Mary Ann Dao RN                    RN   mt2                                                  

                                                                                                  

Corrections: (The following items were deleted from the chart)                                    

                                                                                             

19:39 19:18 Chest For PE Angio+CT.RAD.BRZ ordered. [facility]                                       EDMS

21:48 20:46 Hospitalization Ordered by Dao TALAVERA for Inpatient Admission.           tw4 

      Preliminary diagnosis is Pneumonia, unspecified organism; Tachycardia, unspecified;         

      Hypoxemia. Bed requested for Telemetry/MedSurg (Inpatient). Status is Inpatient             

      Admission. Condition is Stable. Problem is new. Symptoms have improved. tw4                 

22:41 22:10 CORONAVIRUS+MR.LAB.BRZ ordered. [facility]                                              EDMS

23:46 21:48 2020 20:46 Hospitalization Ordered by Yann Dumont MD for Inpatient          

      Admission. Preliminary diagnosis is Pneumonia, unspecified organism; Tachycardia,           

      unspecified; Hypoxemia. Bed requested for Telemetry/MedSurg (Inpatient). Status is          

      Inpatient Admission. Condition is Stable. Problem is new. Symptoms have improved. tw4       

                                                                                             

00:19  23:46 2020 20:46 Hospitalization Ordered by Yann Dumont MD for Inpatient  lp1 

      Admission. Preliminary diagnosis is Pneumonia, unspecified organism; Tachycardia,           

      unspecified; Hypoxemia. Bed requested for Telemetry/MedSurg (Inpatient). Status is          

      Inpatient Admission. Condition is Stable. Problem is new. Symptoms have improved.         

                                                                                                  

**************************************************************************************************

## 2020-08-24 NOTE — RAD REPORT
EXAM DESCRIPTION:  Adonay Single View8/24/2020 8:02 pm

 

CLINICAL HISTORY:  Shortness breath

 

COMPARISON:  CT chest on today's date

 

FINDINGS:   No significant change in the left lung opacities, mediastinal and hilar lymphadenopathy a
nd left pleural effusion

 

The right lung appears clear of acute infiltrate. Heart is normal size

## 2020-08-25 LAB
BLD SMEAR INTERP: (no result)
BUN BLD-MCNC: 25 MG/DL (ref 7–18)
BUN BLD-MCNC: 27 MG/DL (ref 7–18)
GLUCOSE SERPLBLD-MCNC: 159 MG/DL (ref 74–106)
GLUCOSE SERPLBLD-MCNC: 178 MG/DL (ref 74–106)
HCT VFR BLD CALC: 38.2 % (ref 36–45)
HCT VFR BLD CALC: 40 % (ref 36–45)
LYMPHOCYTES # SPEC AUTO: 0.7 K/UL (ref 0.7–4.9)
LYMPHOCYTES # SPEC AUTO: 1.6 K/UL (ref 0.7–4.9)
MAGNESIUM SERPL-MCNC: 1.9 MG/DL (ref 1.8–2.4)
MAGNESIUM SERPL-MCNC: 2.2 MG/DL (ref 1.8–2.4)
MORPHOLOGY BLD-IMP: (no result)
PMV BLD: 7.7 FL (ref 7.6–11.3)
PMV BLD: 7.8 FL (ref 7.6–11.3)
POTASSIUM SERPL-SCNC: 4.1 MMOL/L (ref 3.5–5.1)
POTASSIUM SERPL-SCNC: 4.3 MMOL/L (ref 3.5–5.1)
RBC # BLD: 4.05 M/UL (ref 3.86–4.86)
RBC # BLD: 4.28 M/UL (ref 3.86–4.86)

## 2020-08-25 RX ADMIN — HUMAN INSULIN SCH UNIT: 100 INJECTION, SOLUTION SUBCUTANEOUS at 20:18

## 2020-08-25 RX ADMIN — Medication SCH ML: at 08:00

## 2020-08-25 RX ADMIN — HUMAN INSULIN SCH: 100 INJECTION, SOLUTION SUBCUTANEOUS at 16:30

## 2020-08-25 RX ADMIN — ARFORMOTEROL TARTRATE SCH: 15 SOLUTION RESPIRATORY (INHALATION) at 12:54

## 2020-08-25 RX ADMIN — Medication SCH ML: at 20:19

## 2020-08-25 RX ADMIN — IPRATROPIUM BROMIDE SCH MG: 0.5 SOLUTION RESPIRATORY (INHALATION) at 19:50

## 2020-08-25 RX ADMIN — ONDANSETRON PRN MG: 2 INJECTION INTRAMUSCULAR; INTRAVENOUS at 20:20

## 2020-08-25 RX ADMIN — HUMAN INSULIN SCH: 100 INJECTION, SOLUTION SUBCUTANEOUS at 11:30

## 2020-08-25 RX ADMIN — ARFORMOTEROL TARTRATE SCH: 15 SOLUTION RESPIRATORY (INHALATION) at 20:00

## 2020-08-25 RX ADMIN — HUMAN INSULIN SCH: 100 INJECTION, SOLUTION SUBCUTANEOUS at 07:30

## 2020-08-25 RX ADMIN — IPRATROPIUM BROMIDE SCH MG: 0.5 SOLUTION RESPIRATORY (INHALATION) at 14:01

## 2020-08-25 RX ADMIN — TRAMADOL HYDROCHLORIDE PRN MG: 50 TABLET, COATED ORAL at 14:23

## 2020-08-25 RX ADMIN — METHYLPREDNISOLONE SODIUM SUCCINATE SCH MG: 40 INJECTION, POWDER, FOR SOLUTION INTRAMUSCULAR; INTRAVENOUS at 20:18

## 2020-08-25 RX ADMIN — MORPHINE SULFATE PRN MG: 2 INJECTION, SOLUTION INTRAMUSCULAR; INTRAVENOUS at 20:20

## 2020-08-25 NOTE — RAD REPORT
EXAM DESCRIPTION:  CT - Thorax Wo Con - 8/25/2020 2:09 am

 

CLINICAL HISTORY:  64 years Female, pneumonia

 

TECHNIQUE:  5 mm axial images of the thorax are obtained along with 2 mm reformatting coronal and sag
ittal images.

This exam was performed according to our departmental dose-optimization program, which includes autom
ated exposure control, adjustment of the mA and/or kV according to patient size and/or use of iterati
ve reconstruction technique.

 

COMPARISON:  7/30/2020.

INTRAVENOUS CONTRAST:  None.

 

FINDINGS:  Lung fields:

There is moderately severe paraseptal and centrilobular emphysematous lung disease.

There is a moderate severe diffuse pulmonary infiltrates in the left upper lobe.

There is severe compressive atelectasis of the left lower lobe.

There is a spiculated mass in the left upper lobe posteriorly measuring 2.1 x 1.5 cm, stable.

There is a spiculated mass in the right upper lobe measuring 1.0 x 1.5 cm, stable.

Mediastinal structures:

There is a large mediastinal mass which extends from the anterior mediastinum into the middle mediast
inum in the subcarinal space.

This measures 13.1 x 10.6 cm in maximal AP and transverse dimensions. This is stable.

There are enlarged anterior mediastinal lymph nodes consistent with adenopathy.

These increase in size from prior study. Largest node measures 2.0 x 1.4 cm.

There is severe atherosclerotic disease about the thoracic aorta and its branch vessels.

There is a small pericardial effusion.

Pleural space:

There is a large left pleural effusion, stable.

There is a moderate-sized right pleural effusion which is new from prior study.

No active pleural disease.

Upper abdomen:

There is evidence of diffuse hepatic metastatic disease.

Axillae:

No adenopathy.

Bony structures:

No suspicious lesions.

 

IMPRESSION:  1. Large mediastinal mass, stable.

2. Mediastinal adenopathy which is increased in severity.

3. Moderately severe diffuse pulmonary infiltrates in the left upper lobe.

4. Severe compressive atelectasis of the left lower lobe.

5. Spiculated mass lesions in the upper lobes bilaterally.

 

Electronically signed by:   Jose Benson MD   8/24/2020 10:33 PM CDT Workstation: [id number]

 

 

Due to temporary technical issues with the PACS/Fluency reporting system, reports are being signed by
 the in house radiologist without review as a courtesy to ensure prompt reporting. The interpreting r
adiologist is fully responsible for the content of the report.

## 2020-08-25 NOTE — EKG
Test Date:    2020-08-24               Test Time:    20:01:45

Technician:   RITCHIE                                     

                                                     

MEASUREMENT RESULTS:                                       

Intervals:                                           

Rate:         121                                    

MN:           132                                    

QRSD:         76                                     

QT:           322                                    

QTc:          457                                    

Axis:                                                

P:            55                                     

MN:           132                                    

QRS:          2                                      

T:            150                                    

                                                     

INTERPRETIVE STATEMENTS:                                       

                                                     

Sinus tachycardia

Low voltage QRS

Septal infarct, age undetermined

T wave abnormality, consider lateral ischemia

Abnormal ECG

Compared to ECG 08/06/2020 19:50:03

T-wave abnormality now present

Possible ischemia now present

Myocardial infarct finding still present



Electronically Signed On 08-25-20 10:43:21 CDT by Tony Mae

## 2020-08-25 NOTE — RAD REPORT
EXAM DESCRIPTION:  RAD - Chest Single View - 8/25/2020 6:10 pm

 

CLINICAL HISTORY:  SVT

 

COMPARISON:  CT chest August 24, portable chest August 24

 

TECHNIQUE:  AP portable chest image was obtained 8/25/2020 6:10 pm .

 

FINDINGS:  Interstitial thickening has increased slightly in the right lung field. This is partly due
 to a more shallow inspiratory effort. Large left pleural effusion is a known finding and is not cory
rly different. Minimal right pleural effusion is evident as well. Enlarged mediastinum is noted. This
 is a known finding. Interstitial markings appear slightly increased in the upper left lung field is 
well. Most of the left lung base is obscured by the large pleural effusion. Heart size is not clearly
 different from comparison. Vasculature is prominent but not clearly different. No pneumothorax.

 

IMPRESSION:  Vascular engorgement and interstitial opacification are present. This is increased from 
comparison no inspiratory effort may account for this change.

 

A component of failure or volume overload cannot be excluded.

 

Large left pleural effusion enlarged mediastinal mass or known findings.

## 2020-08-25 NOTE — P.PN
Subjective


Date of Service: 08/25/20


Chief Complaint: Pleural effusion/hypoxia





seen this morning, reported feeling slightly better compared to when she first 

showed up to ED





Review of Systems


General: Weakness, Malaise


Eyes: Unremarkable


ENT: Unremarkable


Respiratory: Cough, Shortness of Breath, Sputum


Cardiovascular: Unremarkable


Gastrointestinal: Nausea


Musculoskeletal: Unremarkable


Integumentary: Unremarkable





Physical Examination





- Vital Signs


Temperature: 97.6 F


Blood Pressure: 110/76


Pulse: 111


Respirations: 20


Pulse Ox (%): 100





- Physical Exam


General: Alert


HEENT: Mucous membr. moist/pink, Sclerae nonicteric


Neck: Supple, JVD not distended


Respiratory: Other (absent breath sounds in LLL)


Cardiovascular: No edema, Normal S1 S2, Irregular heart rate/rhythm (sinus 

tachycardia)


Gastrointestinal: Normal bowel sounds


Musculoskeletal: No tenderness


Integumentary: No rashes, No breakdown


Neurological: Normal speech, Normal affect





- Studies


Laboratory Data (last 24 hrs)





08/24/20 22:24: PT 12.3, INR 1.04, APTT 26.6


08/24/20 22:24: WBC 14.1 H, Hgb 13.1, Hct 40.4, Plt Count 436 H


08/24/20 22:24: Sodium 137, Potassium 4.8, BUN 26 H, Creatinine 0.65, Glucose 

169 H, Magnesium 1.5 L, Total Bilirubin 0.5, AST 32, ALT 19, Alkaline 

Phosphatase 108, Lipase 52 L





Microbiology Data (last 24 hrs): 








08/24/20 20:03   Blood  - Blood   Anaerobic Blood Culture - Final


08/24/20 20:09   Blood  - Blood   Anaerobic Blood Culture - Final








Assessment & Plan


Physician Review Additional Text: 





Large left lung pleural effusion complicated by recent diagnosis of stage IV 

lung cancer:


Type 2 diabetes mellitus:


Severe malnutrition:





Large left lung pleural effusion complicated by Stage IV lung cancer


-Pulm consulted, for bronchoscopy today


-Oxygen as needed


-discussed with IR, pt would benefit from pleurocath, effusion likely malignant





T2DM


-accucheks, SSI





Severe malnutrition


-significant weight loss over past few weeks/months, likely in setting of stage 

IV cancer





Afternoon addendum


-called to bedside, pt noted to be in SVT to 220s, feeling very short of breath 

and weak. Narrow complex, monomorphic


-vagal maneuvers did not help, Adenosine was given x3 (6mg, 12mg, 12mg), 

unfortunately she remained in SVT


-Amiodarone 150mg bolus was given, followed by drip. Patient's heart rate 

decreased to ~120-130s, appeared sinus tachycardia on monitor


-BMP fairly unremarkable


-she was transferred to ICU for further monitoring, cardiology will be consulted

in AM





Time Spent Managing Pts Care (In Minutes): 35

## 2020-08-25 NOTE — P.CNS
Date of Consult: 08/25/20


Reason for Consult: For lung cancer shortness of breath


Chief Complaint: Pleural effusion/hypoxia


History of Present Illness: 





Patient is 64 years of age recently diagnosed with stage IV lung cancer the 

pleural metastasis see bronchoscopy shows non-small-cell or the Oncology 

required more tissue presented with worsening dyspnea orthopnea this found to 

have a significant effusion patient has a history of COPD


Allergies





iodine Allergy (Mild, Verified 01/28/18 21:52)


   Itching/Hives/Rash


hydrocodone Allergy (Verified 08/25/20 01:12)


   Unknown








- Past Medical/Surgical History


Diabetic: Yes


-: DM


-: HTN


-: Hypercholesterolemia


-: Depression


-: Restless Leg syndrome


-: Lung Cancer Stage 4


-: Samantha


-: Hysterectomy


-: Hernia


-: Hemoroids removed


-: Catarct Surgery


-: appendectomy


Psychosocial/ Personal History: Lives at home





- Family History


  ** Father


Medical History: Lung disease, Cancer


Notes: colon ca, mesothelioma





  ** Mother


Medical History: Heart disease, Hypertension, Diabetes, Cancer


Notes: uterine, Alzheimers





- Social History


Smoking Status: Current every day smoker


Alcohol use: No


CD- Drugs: No


Caffeine use: Yes


Place of Residence: Home





Review of Systems


General: Weakness, Other (Weight loss)


Respiratory: Cough, Shortness of Breath


Cardiovascular: Chest Pain





Physical Examination














Temp Pulse Resp BP Pulse Ox


 


 97.3 F   110 H  20   99/57 L  98 


 


 08/25/20 12:00  08/25/20 12:00  08/25/20 12:00  08/25/20 12:00  08/25/20 12:00








General: Alert, Oriented x3, Moderate distress


Respiratory: Diminished (Diminished on the left side)


Cardiovascular: No edema, Normal S1 S2


Gastrointestinal: Normal bowel sounds, Soft and benign


Musculoskeletal: No clubbing, No contractures


Laboratory Data (last 24 hrs)





08/24/20 22:24: PT 12.3, INR 1.04, APTT 26.6


08/24/20 22:24: WBC 14.1 H, Hgb 13.1, Hct 40.4, Plt Count 436 H


08/24/20 22:24: Sodium 137, Potassium 4.8, BUN 26 H, Creatinine 0.65, Glucose 

169 H, Magnesium 1.5 L, Total Bilirubin 0.5, AST 32, ALT 19, Alkaline 

Phosphatase 108, Lipase 52 L








- Problems


(1) Lung cancer


Current Visit: No   Status: Acute   


Plan: 


Patient is 64 years of age she is terminally ill from stage IV lung cancer mets 

to the brain now present with worsening dyspnea orthopnea she has significant 

pleural effusion on the left side I recommend PleurX catheter for her as she is 

going to have recurrent pleural effusion including a mediastinal mass biopsy she

wants to be full code pain relief of discuss with the patient regarding 

insertion of a PleurX catheter including a mediastinal biopsy she needs 

aggressive pain relief also added Lunesta patient has not been able to sleep 

including steroids patient is corona virus negative as per nursing staff


Qualifiers: 


   Laterality: left

## 2020-08-26 LAB
BUN BLD-MCNC: 29 MG/DL (ref 7–18)
GLUCOSE SERPLBLD-MCNC: 205 MG/DL (ref 74–106)
HCT VFR BLD CALC: 37.9 % (ref 36–45)
MAGNESIUM SERPL-MCNC: 2 MG/DL (ref 1.8–2.4)
PMV BLD: 7.7 FL (ref 7.6–11.3)
POTASSIUM SERPL-SCNC: 4.2 MMOL/L (ref 3.5–5.1)
RBC # BLD: 4.01 M/UL (ref 3.86–4.86)
UA COMPLETE W REFLEX CULTURE PNL UR: (no result)
UA DIPSTICK W REFLEX MICRO PNL UR: (no result)

## 2020-08-26 PROCEDURE — 0W9B30Z DRAINAGE OF LEFT PLEURAL CAVITY WITH DRAINAGE DEVICE, PERCUTANEOUS APPROACH: ICD-10-PCS

## 2020-08-26 RX ADMIN — Medication SCH ML: at 07:41

## 2020-08-26 RX ADMIN — ONDANSETRON PRN MG: 2 INJECTION INTRAMUSCULAR; INTRAVENOUS at 13:42

## 2020-08-26 RX ADMIN — MORPHINE SULFATE PRN MG: 2 INJECTION, SOLUTION INTRAMUSCULAR; INTRAVENOUS at 22:26

## 2020-08-26 RX ADMIN — Medication SCH ML: at 20:41

## 2020-08-26 RX ADMIN — ONDANSETRON PRN MG: 2 INJECTION INTRAMUSCULAR; INTRAVENOUS at 21:53

## 2020-08-26 RX ADMIN — MORPHINE SULFATE PRN MG: 2 INJECTION, SOLUTION INTRAMUSCULAR; INTRAVENOUS at 03:37

## 2020-08-26 RX ADMIN — IPRATROPIUM BROMIDE SCH MG: 0.5 SOLUTION RESPIRATORY (INHALATION) at 08:00

## 2020-08-26 RX ADMIN — METHYLPREDNISOLONE SODIUM SUCCINATE SCH MG: 40 INJECTION, POWDER, FOR SOLUTION INTRAMUSCULAR; INTRAVENOUS at 08:45

## 2020-08-26 RX ADMIN — ARFORMOTEROL TARTRATE SCH: 15 SOLUTION RESPIRATORY (INHALATION) at 20:00

## 2020-08-26 RX ADMIN — Medication SCH: at 20:42

## 2020-08-26 RX ADMIN — ONDANSETRON PRN MG: 2 INJECTION INTRAMUSCULAR; INTRAVENOUS at 08:45

## 2020-08-26 RX ADMIN — HUMAN INSULIN SCH UNIT: 100 INJECTION, SOLUTION SUBCUTANEOUS at 16:33

## 2020-08-26 RX ADMIN — IPRATROPIUM BROMIDE SCH MG: 0.5 SOLUTION RESPIRATORY (INHALATION) at 01:50

## 2020-08-26 RX ADMIN — ARFORMOTEROL TARTRATE SCH: 15 SOLUTION RESPIRATORY (INHALATION) at 08:00

## 2020-08-26 RX ADMIN — HUMAN INSULIN SCH: 100 INJECTION, SOLUTION SUBCUTANEOUS at 07:20

## 2020-08-26 RX ADMIN — HUMAN INSULIN SCH UNIT: 100 INJECTION, SOLUTION SUBCUTANEOUS at 20:42

## 2020-08-26 RX ADMIN — HUMAN INSULIN SCH: 100 INJECTION, SOLUTION SUBCUTANEOUS at 11:30

## 2020-08-26 RX ADMIN — MORPHINE SULFATE PRN MG: 2 INJECTION, SOLUTION INTRAMUSCULAR; INTRAVENOUS at 08:45

## 2020-08-26 RX ADMIN — METHYLPREDNISOLONE SODIUM SUCCINATE SCH MG: 40 INJECTION, POWDER, FOR SOLUTION INTRAMUSCULAR; INTRAVENOUS at 20:41

## 2020-08-26 RX ADMIN — ONDANSETRON PRN MG: 2 INJECTION INTRAMUSCULAR; INTRAVENOUS at 03:38

## 2020-08-26 NOTE — RAD REPORT
EXAM DESCRIPTION:  RAD - Chest Single View - 8/26/2020 10:29 am

 

CLINICAL HISTORY:  chest tube placement

 

COMPARISON:  August 25

 

TECHNIQUE:  AP portable chest image was obtained 8/26/2020 10:29 am .

 

FINDINGS:  Since the prior study chest tube has been placed. Tube enters approximately seventh inner 
costal space. Tip is in the medial left base. Heart size is unchanged. Mediastinum and heart remain s
hifted to the right. Small apical and left upper chest pneumothorax is present approximately 10-15%. 
No acute bony abnormality seen. No acute aortic findings suspected.

 

Findings telephoned to the referring physician 10:52 a.m..

 

IMPRESSION:  Approximately 10-15% pneumothorax in the left apex and lateral upper left chest followin
g placement of a chest tube.

 

Chest tube enters seventh intercostal space. Tip is in the medial left base.

## 2020-08-26 NOTE — P.PN
Subjective


Date of Service: 08/28/20


Chief Complaint: Pleural effusion/hypoxia


Subjective: Improving





Patient is doing much better status post left-sided PleurX catheter with pain 

has also reduced





Review of Systems


General: Weakness


Respiratory: Shortness of Breath


Cardiovascular: Chest Pain





Physical Examination





- Vital Signs


Temperature: 97.8 F


Blood Pressure: 130/97


Pulse: 98


Respirations: 21


Pulse Ox (%): 99





- Physical Exam


General: Alert, Oriented x3, Mild distress


Respiratory: Diminished (On the left side)


Cardiovascular: No edema, Regular rate/rhythm





- Studies


Microbiology Data (last 24 hrs): 








08/24/20 20:09   Blood  - Blood   Anaerobic Blood Culture - Final


08/24/20 20:03   Blood  - Blood   Anaerobic Blood Culture - Final








Assessment & Plan





- Problems (Diagnosis)


(1) Lung cancer


Current Visit: No   Status: Acute   


Plan: 


Patient has stage IV lung cancer status post left-sided PleurX catheter as been 

placed patient has been feeling much better scheduled for a liver biopsy on 

Friday S patient's pain has improved trach issues Vicodin p.r.n. labs reviewed 

patient is in normal sinus rhythm Dc amiodarone no evidence of AFib transfer to 

the floor


Qualifiers: 


   Laterality: left

## 2020-08-26 NOTE — P.OP
Assistant: NONE,NONE


Preoperative diagnosis: LEFT Malignant Pleural Effusion


Postoperative diagnosis: LEFT Malignant Pleural Effusion


Primary procedure: Placement of LEFT Thoracic Pleur-X catheter 


Anesthesia: Local 1% lidocaine


Estimated blood loss: <1cc


Specimen: Pleural Fluid sent


Findings: straw colored fluid noted


Complications: None


Drain(s): Other (Pleur X catheter)


Transferred to: ICU


Condition: Fair

## 2020-08-26 NOTE — OP
Date of Procedure:  08/26/2020



Surgeon:  Fernando Love MD, MD



Assistant:  None.



Preoperative Diagnosis:  Left malignant pleural effusion.



Postoperative Diagnosis:  Left malignant pleural effusion.



Procedure Performed:  Placement of a left thoracic PleurX catheter.



Anesthesia:  Local 1% lidocaine used.



Estimated Blood Loss:  Less than 1 mL.



Specimen:  Pleural fluid sent.



Findings:  Straw-colored fluid noted.



Complications:  None.



Drains:  The PleurX catheter was left in the left thoracic space.  The patient remained in ICU in victorina
r condition.



Procedure In Detail:  After informed consent was obtained, the patient was prepped and draped in the 
usual sterile fashion.  After adequate anesthesia was achieved, I examined the area of the left chest
 and placed a marking on the left thoracic cavity along approximately the sixth intercostal space on 
the mid axillary line.  I then anesthetized the area along the seventh intercostal space.  After appr
opriately anesthetizing the tract, I made an incision in the skin and a counter incision inferiorly. 
 I entered the thoracic space with a finder needle and immediately encountered straw-colored fluid.  
The wire was advanced at this point and the introducer sheath was removed.  I then tunneled the Pleur
X catheter approximately 4 cm away from the insertion site, inferior and anterior to the insertion si
te and placed the cuff in the midportion of the space between the two.  I then sequentially dilated u
p the tract at the insertion site using Seldinger technique with sequential dilatation.  Introducer s
vivi was placed and a wire out was called at this point.  The PleurX catheter was then placed in the
 thoracic space aiming somewhat cranially and posteriorly and straw-colored fluid was encountered at 
this point.  The patient had a few coughs and suctioned some air during the process, but it was minim
al at this point.  I then removed the introducer sheath and cleansed the area and closed the insertio
n site with an interrupted 3-0 silk suture and secured the chest with the same 3-0 silk suture.  Ster
ile dressing was placed over top and the device was hooked up to the Pneumovax canister.  Straw-color
ed fluid was sent for analysis at this point, and the patient did well at this point.  The patient to
lerated the procedure well without evidence of complication.  The patient remained in the ICU in fair
 condition throughout the entire procedure.  A stat chest x-ray will be performed.  Continue the Pleu
rX catheter drainage at this point.  All counts were correct at the end of the case.





SHANIKA/JONNIE

DD:  08/26/2020 11:59:23Voice ID:  [id number]

DT:  08/26/2020 22:00:07Report ID:  [id number]

## 2020-08-26 NOTE — ECHO
HEIGHT: 5 ft 7 in   WEIGHT: 124 lb 12.8 oz   DATE OF STUDY: 08/26/2020   REFER DR: 
Raul Downey MD

2-DIMENSIONAL: YES

     M.MODE: YES

 DOPPLER: YES

COLOR FLOW: YES



                    TDS:  YES

PORTABLE: 

 DEFINITY:  

BUBBLE STUDY: 





DIAGNOSIS:  LUNG CANCER, POSSIBLE PERICARDIAL EFFUSION



CARDIAC HISTORY:  

CATHERIZATION: NO

SURGERY: NO

PROSTHETIC VALVE: NO

PACEMAKER: NO





MEASUREMENTS (cm)

    DIASTOLIC (NORMALS)             SYSTOLIC (NORMALS)

IVSd                  (0.6-1.2)                                 LA Diam  (1.9-4.0)     
LVEF         %  

LVIDd                (3.5-5.7)                        LVIDs       (2.0-3.5)     %FS        
  %

LVPWd              (0.6-1.2)

Ao Diam            (2.0-3.7)



2 DIMENSIONAL ASSESSMENT:

RIGHT ATRIUM:                   NORMAL

LEFT ATRIUM:       NORMAL



RIGHT VENTRICLE:            NORMAL

LEFT VENTRICLE: NORMAL



TRICUSPID VALVE:             NORMAL

MITRAL VALVE:     NORMAL



PULMONIC VALVE:             NORMAL

AORTIC VALVE:     NORMAL



PERICARDIAL EFFUSION: NONE

AORTIC ROOT:      NORMAL





LEFT VENTRICULAR WALL MOTION:     NORMAL



DOPPLER/COLOR FLOW:     NORMAL



COMMENTS:      TECHNICALLY DIFFICULT STUDY.  POSITIVE PLEURAL EFFUSION BILATERAL.  

                            NO PERICARDIAL EFFUSION.



TECHNOLOGIST:   SILVA PEREZ

## 2020-08-26 NOTE — CON
Date of Consultation:  08/26/2020



Brief History Of Present Illness:  The patient is a 64-year-old  female with past medical hi
story of diabetes, hypertension, depression, and stage IV lung cancer who presents to the emergency r
oom with complaints of worsening shortness of breath and difficulty breathing.  She has had worsening
 shortness of breath over the course of the past month approximately.  Her daughter called EMS when s
he felt as if she could not catch her breath.  The patient was tachypneic on initial evaluation.  She
 was given medication.  She had an episode of SVT and was placed on amiodarone drip in the ICU.  She 
was originally scheduled to get a PET scan after recent diagnosis of a stage IV lung cancer and a pos
itive smoking history.  I spoke with Dr. Raul Burton who requested placement of a PleurX long-term
 left thoracic catheter for long-term management of this patient's malignant pleural effusion for sym
ptomatic relief and improvement.



Past Medical History:  Significant for diabetes, hypertension, hypercholesterolemia, depression, rest
less legs syndrome.



Past Surgical History:  Includes cholecystectomy, hysterectomy, hernia repair, hemorrhoid removed, ca
taract surgery, appendectomy.



Allergies:  TO IODINE AND HYDROCODONE.



Home Medications:  Include glimepiride, lisinopril, metformin, simvastatin, Flexeril, insulin, folic 
acid, Neurontin, methotrexate, tramadol.



Family History:  Significant for father and mother both had cancer.  Father had colon cancer and moth
er had uterine cancer.  She has a positive significant smoking history.  Denies alcohol or recreation
al drug use.



Review of Systems:

Ten-point review of systems other than HPI; she has shortness of breath with exertion and weight loss
.  General malaise and fatigue as well.



Physical Examination:

Vital Signs:  At the time of my examination, her blood pressure was 117/64, pulse is 100, respiratory
 rate 23, temperature 98.2. 

General:  She is awake, alert, oriented. 

Psychiatric:  She is appropriate conversive.  

HEENT:  She is normocephalic.  Her sclerae were anicteric.  Her mucous membranes are moist.  Orophary
nx is clear. 

Neck:  Supple without JVD.  

Chest:  She had decreased breath sounds significantly on the left.  It was dull to percussion. 

Abdomen:  Soft. 

Extremities:  No clubbing, cyanosis, or edema. 

Skin:  Warm and dry.



Laboratory Data:  Otherwise, she had a laboratory exam, 

which was performed on 08/26 which officially shows a white blood cell count of 12.5, hemoglobin of 1
2.3, hematocrit 37.9, platelet count is 428.  Her sodium is 135, potassium 4.2, chloride 101, carbon 
dioxide 26, BUN 29, creatinine 0.69.  The glucose is 205.  Lactic acid was 2.5 on admission.  She had
 imaging performed, which included a chest CT on 08/24, which was officially read as a large mediasti
nal mass, stable mediastinal adenopathy, which is increased in severity, moderate to severe diffuse p
ulmonary infiltrates in the left upper lobe, a severe compressive atelectasis of the left lower lobe,
 spiculated mass, lesions of the upper lobes bilaterally.  There is a large left pleural effusion, wh
ich is stable.  Moderate right-sided pleural effusion, which is new from prior study.  No active pleu
ral disease.



Assessment And Plan:  This is a 64-year-old female who has a malignant pleural effusion on the left.

1.Continue medical management.

2.I have explained the risks, benefits, and alternatives of placement of PleurX left thoracic cathet
er including but not limited to bleeding, infection, pneumothorax, need for further operations and pr
ocedures, infection, blood clots, need for more surgery or 

procedures.  The patient agrees to proceed as indicated. 

Thank you for this interesting consult.





SHANIKA/JONNIE

DD:  08/26/2020 15:08:51Voice ID:  [id number]

DT:  08/26/2020 19:20:08Report ID:  [id number]

## 2020-08-26 NOTE — P.PN
Subjective


Date of Service: 08/26/20


Chief Complaint: Pleural effusion/hypoxia





had a few short runs of SVT overnight but came back down without intervention


"doing ok" this morning, reports occasionally feeling like she is "drowning"





Physical Examination





- Vital Signs


Temperature: 98.2 F


Blood Pressure: 118/72


Pulse: 99


Respirations: 16


Pulse Ox (%): 94





- Physical Exam


General: Mild distress


HEENT: Mucous membr. moist/pink


Neck: JVD not distended


Respiratory: Diminished, Other (no breath sounds at left lower lobe)


Cardiovascular: No edema, Normal S1 S2 (tachycardic: )


Gastrointestinal: Soft and benign, Non-distended, No tenderness


Musculoskeletal: No erythema, No tenderness


Integumentary: No rashes


Neurological: Normal speech, Normal affect





- Studies


Microbiology Data (last 24 hrs): 








08/24/20 20:09   Blood  - Blood   Anaerobic Blood Culture - Final


08/24/20 20:03   Blood  - Blood   Anaerobic Blood Culture - Final








Assessment & Plan


Physician Review Additional Text: 





Large left lung pleural effusion complicated by recent diagnosis of stage IV 

lung cancer:


Type 2 diabetes mellitus:


Severe malnutrition:





Large left lung pleural effusion complicated by Stage IV lung cancer


-Pulm consulted, s/p bronchoscopy yesterday, pleurocath today, should alleviate 

patient's feeling of drowning


-likely causing cardiac stress and contributing /cause of SVT


-Oxygen as needed





SVT


-as noted yesterday, still on amiodarone drip this morning, appears sinus 

tachycardic () now


-likely due to pleural effusion in addition to patient's baseline sinus 

tachycardia


-may not need medication after pleurocath placed vs beta-blocker for some rate 

control


-cardiology consulted for assistance





T2DM


-accucheks, SSI





Severe malnutrition


-significant weight loss over past few weeks/months, likely in setting of stage 

IV cancer








Time Spent Managing Pts Care (In Minutes): 40

## 2020-08-26 NOTE — RAD REPORT
EXAM DESCRIPTION:  RADChest Single View8/26/2020 3:07 pm

 

CLINICAL HISTORY:  Pneumothorax

 

COMPARISON:  August 26

 

FINDINGS:  Left pneumothorax has decreased size and is small. Left chest tube in place.

 

Left pleural effusion has decreased in size

 

No other change

## 2020-08-27 LAB
BUN BLD-MCNC: 31 MG/DL (ref 7–18)
GLUCOSE SERPLBLD-MCNC: 233 MG/DL (ref 74–106)
HCT VFR BLD CALC: 42.2 % (ref 36–45)
PMV BLD: 8.1 FL (ref 7.6–11.3)
POTASSIUM SERPL-SCNC: 4.5 MMOL/L (ref 3.5–5.1)
RBC # BLD: 4.5 M/UL (ref 3.86–4.86)

## 2020-08-27 RX ADMIN — Medication SCH: at 07:22

## 2020-08-27 RX ADMIN — METHYLPREDNISOLONE SODIUM SUCCINATE SCH MG: 40 INJECTION, POWDER, FOR SOLUTION INTRAMUSCULAR; INTRAVENOUS at 07:43

## 2020-08-27 RX ADMIN — Medication SCH ML: at 07:43

## 2020-08-27 RX ADMIN — Medication SCH: at 20:46

## 2020-08-27 RX ADMIN — ONDANSETRON PRN MG: 2 INJECTION INTRAMUSCULAR; INTRAVENOUS at 04:30

## 2020-08-27 RX ADMIN — METOPROLOL TARTRATE SCH MG: 25 TABLET ORAL at 16:59

## 2020-08-27 RX ADMIN — METHYLPREDNISOLONE SODIUM SUCCINATE SCH MG: 40 INJECTION, POWDER, FOR SOLUTION INTRAMUSCULAR; INTRAVENOUS at 20:47

## 2020-08-27 RX ADMIN — FOLIC ACID SCH MG: 1 TABLET ORAL at 08:10

## 2020-08-27 RX ADMIN — PIPERACILLIN SODIUM AND TAZOBACTAM SODIUM SCH MLS: 3; .375 INJECTION, POWDER, LYOPHILIZED, FOR SOLUTION INTRAVENOUS at 09:06

## 2020-08-27 RX ADMIN — HUMAN INSULIN SCH: 100 INJECTION, SOLUTION SUBCUTANEOUS at 21:00

## 2020-08-27 RX ADMIN — ARFORMOTEROL TARTRATE SCH MCG: 15 SOLUTION RESPIRATORY (INHALATION) at 07:55

## 2020-08-27 RX ADMIN — TRAMADOL HYDROCHLORIDE PRN MG: 50 TABLET, COATED ORAL at 09:44

## 2020-08-27 RX ADMIN — HUMAN INSULIN SCH UNIT: 100 INJECTION, SOLUTION SUBCUTANEOUS at 16:49

## 2020-08-27 RX ADMIN — ARFORMOTEROL TARTRATE SCH MCG: 15 SOLUTION RESPIRATORY (INHALATION) at 21:14

## 2020-08-27 RX ADMIN — PIPERACILLIN SODIUM AND TAZOBACTAM SODIUM SCH MLS: 3; .375 INJECTION, POWDER, LYOPHILIZED, FOR SOLUTION INTRAVENOUS at 16:19

## 2020-08-27 RX ADMIN — MORPHINE SULFATE PRN MG: 2 INJECTION, SOLUTION INTRAMUSCULAR; INTRAVENOUS at 04:30

## 2020-08-27 RX ADMIN — Medication SCH ML: at 20:47

## 2020-08-27 RX ADMIN — HUMAN INSULIN SCH UNIT: 100 INJECTION, SOLUTION SUBCUTANEOUS at 12:00

## 2020-08-27 RX ADMIN — HUMAN INSULIN SCH UNIT: 100 INJECTION, SOLUTION SUBCUTANEOUS at 07:42

## 2020-08-27 NOTE — RAD REPORT
[facility] DES[facility]  [facility] 6[facility]30 am

 

CLINICAL HISTORY:  Pleural effusion

 

COMPARISON:  August 26

 

FINDINGS:  Left pleural effusion appears mildly increased in size. It has relatively small

 

Small left pleural effusion

 

No change in the bilateral pulmonary opacities. Mediastinal mass is again demonstrated

## 2020-08-27 NOTE — CON
Date of Consultation:  08/26/2020



Admitted to Dr. Dumont's service on 08/24/2020.  I saw the patient on 08/26/2020.



Reason For Consultation:  Supraventricular tachycardia.



History Of Present Illness:  Ms. Gaffney is a 64-year-old unfortunate woman, who has just been diagno
sed with stage IV lung cancer, therapy have not started yet, but she will be going to the Cancer [facility]
er here locally.  She also has a history of diabetes, hypertension, dyslipidemia, and depression.  Ha
d a negative stress test in 2018.  While she has been in the hospital, evaluated for her lung cancer 
and pleural effusion.  She went into SVT, unresponsive to Adenocard.  She is now on IV amiodarone.  S
he converted.  Her pleural effusion was treated with a chest tube by Dr. Love.  She has a small pn
eumothorax, but is feeling much better right now and is asymptomatic in sinus rhythm.  When she had a
trial fib with the SVT, she was very symptomatic with palpitations and shortness of breath.  Echocard
iogram, which was done, revealed no pericardial effusion and a normal ejection fraction.



Past Medical History:  As stated above.



Allergies:  SHE IS ALLERGIC TO IODINE AND __________.



Medications:  Include amiodarone IV, inhalers, steroids, antibiotics.



Review of Systems:

Negative.



Social History:  Positive for tobacco.



Family History:  Negative.



Physical Examination:

General:  She is in sinus rhythm.  Blood pressure is 117/64.  She weighs 124 pounds. 

HEENT:  Negative. 

Neck:  Supple with no bruit. 

Chest:  Revealed a chest tube presence on the left side.  Decreased breath sounds bilaterally. 

Cardiac:  Revealed a regular rhythm and rate without any murmurs, gallops, or rubs. 

Abdomen:  Benign. 

Extremities:  Revealed no clubbing, cyanosis, or edema.



Diagnostic Data:  Chest x-ray showed effusion.  Echocardiogram was normal without pericardial effusio
n.  White count is 12,000.  D-dimer is 2281.  BNP is 1263.



Impression And Plan:  Supraventricular tachycardia secondary to hypoxia, lung cancer, pleural effusio
n.  She is on IV amiodarone right now.  This would not exactly be my choice for p.o. considering her 
lung status.  I would like to keep her on amiodarone for 24 hours and maybe switch her to a beta-bloc
ker tomorrow.  I think having the effusion resolve with the chest tube would help prevent the suprave
ntricular tachycardia.  Her echocardiogram is normal.  We need to make sure her potassium and magnesi
um stay adequate.  Her other issues include diabetes, hypertension, dyslipidemia, and depression.  Th
mirta are stable.  She is on inhalers, steroids, and antibiotics.  She will hopefully start chemotherap
y soon.  She has had a negative stress test in 2018.  I do not plan to repeat any further cardiac wor
kup on her.  As stated earlier, continue amiodarone for 24 hours IV, switch to beta-blockers and see 
how she does.  I will continue to follow her.





NB/JONNIE

DD:  08/26/2020 21:06:34Voice ID:  [id number]

DT:  08/27/2020 02:45:24Report ID:  [id number]

## 2020-08-27 NOTE — P.PN
Subjective


Date of Service: 08/27/20


Chief Complaint: Pleural effusion/hypoxia





s/p pleurocath yesterday, feeling significant better. Still uncomfortable in bed

- constantly re-positioning her self. Reports some mild fatigue/generalized 

weakness


Breathing more comfortably, no chest pain, no abdominal pain, urinating without 

issue, has not had BM here, +flatus





Review of Systems


10-point ROS is otherwise unremarkable





Physical Examination





- Vital Signs


Temperature: 96.9 F


Blood Pressure: 112/64


Pulse: 97


Respirations: 18


Pulse Ox (%): 98





- Physical Exam


General: Alert, In no apparent distress


Respiratory: Diminished, Crackles/rales (left lower lung fields (improved))


Cardiovascular: No edema, Regular rate/rhythm (occasionally in 90s-105), Normal 

S1 S2


Gastrointestinal: Normal bowel sounds, Soft and benign, Non-distended, No 

tenderness


Musculoskeletal: No erythema, No tenderness


Integumentary: No rashes, No breakdown


Neurological: Normal affect





Assessment & Plan


Physician Review Additional Text: 





Large left lung pleural effusion complicated by recent diagnosis of stage IV l

noah cancer:


Type 2 diabetes mellitus:


Severe malnutrition:





Large left lung pleural effusion complicated by Stage IV lung cancer


-Pulm consulted, s/p bronch, s/p pleurocath 8/26, feeling much improved


-likely caused cardiac stress and contributed to development of SVT


-continue home pain meds (tramadol, gabapentin)


-Oxygen as needed





SVT


-remained on amio drip overnight


-likely due to pleural effusion in addition to patient's baseline sinus 

tachycardia


-cardiology consulted - recommend switching to beta-blocker PO today





T2DM


-accucheks, SSI





Severe malnutrition


-significant weight loss over past few weeks/months, likely in setting of stage 

IV cancer








Time Spent Managing Pts Care (In Minutes): 35

## 2020-08-28 VITALS — SYSTOLIC BLOOD PRESSURE: 103 MMHG | DIASTOLIC BLOOD PRESSURE: 64 MMHG

## 2020-08-28 VITALS — TEMPERATURE: 97.8 F

## 2020-08-28 VITALS — OXYGEN SATURATION: 99 %

## 2020-08-28 LAB
BUN BLD-MCNC: 36 MG/DL (ref 7–18)
GLUCOSE SERPLBLD-MCNC: 217 MG/DL (ref 74–106)
HCT VFR BLD CALC: 40.9 % (ref 36–45)
PMV BLD: 7.8 FL (ref 7.6–11.3)
POTASSIUM SERPL-SCNC: 4.9 MMOL/L (ref 3.5–5.1)
RBC # BLD: 4.32 M/UL (ref 3.86–4.86)

## 2020-08-28 RX ADMIN — Medication SCH: at 09:00

## 2020-08-28 RX ADMIN — ARFORMOTEROL TARTRATE SCH MCG: 15 SOLUTION RESPIRATORY (INHALATION) at 09:55

## 2020-08-28 RX ADMIN — PIPERACILLIN SODIUM AND TAZOBACTAM SODIUM SCH MLS: 3; .375 INJECTION, POWDER, LYOPHILIZED, FOR SOLUTION INTRAVENOUS at 08:48

## 2020-08-28 RX ADMIN — HUMAN INSULIN SCH: 100 INJECTION, SOLUTION SUBCUTANEOUS at 07:30

## 2020-08-28 RX ADMIN — METHYLPREDNISOLONE SODIUM SUCCINATE SCH MG: 40 INJECTION, POWDER, FOR SOLUTION INTRAMUSCULAR; INTRAVENOUS at 08:48

## 2020-08-28 RX ADMIN — Medication SCH ML: at 09:00

## 2020-08-28 RX ADMIN — METOPROLOL TARTRATE SCH: 25 TABLET ORAL at 05:26

## 2020-08-28 RX ADMIN — PIPERACILLIN SODIUM AND TAZOBACTAM SODIUM SCH MLS: 3; .375 INJECTION, POWDER, LYOPHILIZED, FOR SOLUTION INTRAVENOUS at 00:15

## 2020-08-28 RX ADMIN — FOLIC ACID SCH: 1 TABLET ORAL at 09:00

## 2020-08-28 RX ADMIN — ONDANSETRON PRN MG: 2 INJECTION INTRAMUSCULAR; INTRAVENOUS at 01:59

## 2020-08-28 RX ADMIN — MORPHINE SULFATE PRN MG: 2 INJECTION, SOLUTION INTRAMUSCULAR; INTRAVENOUS at 01:59

## 2020-08-28 NOTE — P.PN
Subjective


Date of Service: 08/28/20


Chief Complaint: Stage IV lung cancer





Patient is doing better she is weak shortness of breath and pain has improved 

schedule for a liver biopsy today





Review of Systems


General: Weakness


Respiratory: Shortness of Breath





Physical Examination





- Vital Signs


Temperature: 97.8 F


Blood Pressure: 130/97


Pulse: 98


Respirations: 21


Pulse Ox (%): 99





- Physical Exam


General: Alert, Oriented x3, Mild distress


Respiratory: Diminished (Diminished on the left side)


Cardiovascular: No edema, Regular rate/rhythm





Assessment & Plan





- Problems (Diagnosis)


(1) Lung cancer


Current Visit: No   Status: Acute   


Plan: 


Patient has stage IV lung cancer awaiting liver biopsy pain has improved she 

still draining quite a bit from a PleurX catheter no evidence of sepsis suspect 

her white count is elevated due to the steroids of Dc the Solu-Medrol after b

iopsy patient can be discharged home with home health drain the PleurX twice a 

week continue with her home medications follow-up with me next week saturation 

satisfactory prognosis very poor him mets to the brain


Qualifiers: 


   Laterality: left 


Physician Review Additional Text: 





I reviewed the plan of care for this patient on 8/24/20 by Dao Wu, and

I agree with the management as noted above.

## 2020-08-28 NOTE — P.DS
Admission Date: 08/24/20


Discharge Date: 08/29/20


Disposition: ROUTINE DISCHARGE


Discharge Condition: FAIR


Reason for Admission: Stage IV lung cancer


Vital Signs/Physical Exam: 














Temp Pulse Resp BP Pulse Ox


 


 97.8 F   108 H  16   103/64   98 


 


 08/28/20 13:00  08/28/20 14:00  08/28/20 14:00  08/28/20 14:00  08/28/20 14:00








Laboratory Data at Discharge: 














WBC  26.7 K/uL (4.3-10.9)  H* D 08/28/20  04:34    


 


Hgb  13.1 g/dL (12.0-15.0)   08/28/20  04:34    


 


Hct  40.9 % (36.0-45.0)   08/28/20  04:34    


 


Plt Count  398 K/uL (152-406)  D 08/28/20  04:34    


 


PT  12.3 SECONDS (9.5-12.5)   08/24/20  22:24    


 


INR  1.04   08/24/20  22:24    


 


APTT  26.6 SECONDS (24.3-36.9)   08/24/20  22:24    


 


Sodium  136 mmol/L (136-145)   08/28/20  04:34    


 


Potassium  4.9 mmol/L (3.5-5.1)   08/28/20  04:34    


 


BUN  36 mg/dL (7-18)  H  08/28/20  04:34    


 


Creatinine  0.84 mg/dL (0.55-1.3)   08/28/20  04:34    


 


Glucose  217 mg/dL ()  H  08/28/20  04:34    


 


Phosphorus  3.4 mg/dL (2.5-4.9)   08/25/20  16:58    


 


Magnesium  2.0 mg/dL (1.8-2.4)   08/26/20  04:28    


 


Total Bilirubin  0.5 mg/dL (0.2-1.0)   08/24/20  22:24    


 


AST  32 U/L (15-37)   08/24/20  22:24    


 


ALT  19 U/L (12-78)   08/24/20  22:24    


 


Alkaline Phosphatase  108 U/L ()   08/24/20  22:24    


 


Lipase  52 U/L ()  L  08/24/20  22:24    








Home Medications: 








Cyclobenzaprine [Flexeril*] 1 tab PO BEDTIME 08/26/20 


Ergocalciferol (Vitamin D2) [Vitamin D2] 1 tab PO SEECOM 08/26/20 


Folic Acid 1 tab PO DAILY 08/26/20 


Gabapentin 2 tab PO BEDTIME 08/26/20 


Metformin HCl 500 mg PO BID 08/26/20 


Tramadol HCl [Ultram] 1 tab PO BID PRN 08/26/20 


dexAMETHasone [Dexamethasone] 2 mg PO TID 08/26/20 


Metoprolol Tartrate [Lopressor*] 25 mg PO BID 6AM 6PM 30 Days #60 tab 08/28/20 





New Medications: 


Metoprolol Tartrate [Lopressor*] 25 mg PO BID 6AM 6PM 30 Days #60 tab


Patient Discharge Instructions: Follow up with PCP within 1 week.  Follow up 

with Dr. Downey.  Follow up with Dr. Martinez.  Liver biopsy will need to be set 

up as  outpatient


Diet: AHA


Activity: Ad go

## 2020-08-28 NOTE — RAD REPORT
EXAM DESCRIPTION:  RAD - Chest Single View - 8/28/2020 6:53 am

 

CLINICAL HISTORY:  Malignant pleural effusion

 

COMPARISON:  August 27

 

TECHNIQUE:  AP portable chest image was obtained 8/28/2020 6:53 am .

 

FINDINGS:  Minimal left apical pneumothorax is present. This is not substantially different the tony
rison. Chest tube remains in place. Mediastinal mass again noted.

 

Lung volumes are low. Patchy bilateral opacification, worse on the left similar to comparison. Heart 
size is normal and stable.

 

IMPRESSION:  Minimal left apical pneumothorax similar to comparison. No change in position of the arianna
st tube.

 

Bilateral lung parenchymal opacification not substantially different from comparison.

## 2020-08-28 NOTE — PN
Date of Progress Note:  08/28/2020



Ms. Gaffney had SVT after being admitted for bilateral pleural effusion, lung cancer stage IV, status
 post chest tube.  We saw her for SVT.  Today is 08/28/2020.  Yesterday, we switched her from IV amio
darone to p.o. metoprolol and she remained in sinus rhythm.  Her overall status has improved.  Her la
st blood pressure was 103/64.  She has sinus tachycardia at a rate of 108.  She is afebrile.  Her O2 
saturations are 99% on nasal cannula.  She still has an elevated white count.  There is no evidence o
f sepsis.  Her white count is presumed to be elevated because of steroids.  She is awaiting a liver b
iopsy.  She will be discharged on home health for her chest tube.  Dr. Downey will be following her
.  From a cardiac standpoint, I believe sending her home on a low-dose metoprolol is reasonable assum
ing her blood pressure tolerates it.  I will sign off her case for now.





NB/JONNIE

DD:  08/28/2020 20:04:01Voice ID:  [id number]

DT:  08/28/2020 22:49:09Report ID:  [id number]

## 2020-08-29 ENCOUNTER — HOSPITAL ENCOUNTER (OUTPATIENT)
Dept: HOSPITAL 97 - ER | Age: 65
Setting detail: OBSERVATION
LOS: 3 days | Discharge: HOSPICE | End: 2020-09-01
Admitting: FAMILY MEDICINE
Payer: COMMERCIAL

## 2020-08-29 VITALS — BODY MASS INDEX: 18.9 KG/M2

## 2020-08-29 DIAGNOSIS — R53.81: ICD-10-CM

## 2020-08-29 DIAGNOSIS — G25.81: ICD-10-CM

## 2020-08-29 DIAGNOSIS — C79.31: ICD-10-CM

## 2020-08-29 DIAGNOSIS — C34.92: ICD-10-CM

## 2020-08-29 DIAGNOSIS — I10: ICD-10-CM

## 2020-08-29 DIAGNOSIS — J44.9: ICD-10-CM

## 2020-08-29 DIAGNOSIS — J96.01: Primary | ICD-10-CM

## 2020-08-29 DIAGNOSIS — E43: ICD-10-CM

## 2020-08-29 DIAGNOSIS — E11.9: ICD-10-CM

## 2020-08-29 DIAGNOSIS — I47.1: ICD-10-CM

## 2020-08-29 DIAGNOSIS — J93.9: ICD-10-CM

## 2020-08-29 DIAGNOSIS — Z79.899: ICD-10-CM

## 2020-08-29 DIAGNOSIS — F32.9: ICD-10-CM

## 2020-08-29 DIAGNOSIS — E78.5: ICD-10-CM

## 2020-08-29 DIAGNOSIS — I95.9: ICD-10-CM

## 2020-08-29 DIAGNOSIS — Z87.891: ICD-10-CM

## 2020-08-29 DIAGNOSIS — Z79.84: ICD-10-CM

## 2020-08-29 LAB
ALBUMIN SERPL BCP-MCNC: 2.6 G/DL (ref 3.4–5)
ALP SERPL-CCNC: 116 U/L (ref 45–117)
ALT SERPL W P-5'-P-CCNC: 18 U/L (ref 12–78)
AST SERPL W P-5'-P-CCNC: 33 U/L (ref 15–37)
BUN BLD-MCNC: 43 MG/DL (ref 7–18)
GLUCOSE SERPLBLD-MCNC: 321 MG/DL (ref 74–106)
HCT VFR BLD CALC: 42.6 % (ref 36–45)
INR BLD: 0.97
LYMPHOCYTES # SPEC AUTO: 0.4 K/UL (ref 0.7–4.9)
MAGNESIUM SERPL-MCNC: 2 MG/DL (ref 1.8–2.4)
MORPHOLOGY BLD-IMP: (no result)
NT-PROBNP SERPL-MCNC: 1830 PG/ML (ref ?–125)
PMV BLD: 8 FL (ref 7.6–11.3)
POTASSIUM SERPL-SCNC: 4.8 MMOL/L (ref 3.5–5.1)
RBC # BLD: 4.53 M/UL (ref 3.86–4.86)
TROPONIN (EMERG DEPT USE ONLY): 0.2 NG/ML (ref 0–0.04)

## 2020-08-29 PROCEDURE — 81015 MICROSCOPIC EXAM OF URINE: CPT

## 2020-08-29 PROCEDURE — 83605 ASSAY OF LACTIC ACID: CPT

## 2020-08-29 PROCEDURE — 99285 EMERGENCY DEPT VISIT HI MDM: CPT

## 2020-08-29 PROCEDURE — 85025 COMPLETE CBC W/AUTO DIFF WBC: CPT

## 2020-08-29 PROCEDURE — 80048 BASIC METABOLIC PNL TOTAL CA: CPT

## 2020-08-29 PROCEDURE — 93005 ELECTROCARDIOGRAM TRACING: CPT

## 2020-08-29 PROCEDURE — 80076 HEPATIC FUNCTION PANEL: CPT

## 2020-08-29 PROCEDURE — 83880 ASSAY OF NATRIURETIC PEPTIDE: CPT

## 2020-08-29 PROCEDURE — 84484 ASSAY OF TROPONIN QUANT: CPT

## 2020-08-29 PROCEDURE — 87088 URINE BACTERIA CULTURE: CPT

## 2020-08-29 PROCEDURE — 71045 X-RAY EXAM CHEST 1 VIEW: CPT

## 2020-08-29 PROCEDURE — 85610 PROTHROMBIN TIME: CPT

## 2020-08-29 PROCEDURE — 36415 COLL VENOUS BLD VENIPUNCTURE: CPT

## 2020-08-29 PROCEDURE — 94640 AIRWAY INHALATION TREATMENT: CPT

## 2020-08-29 PROCEDURE — 82947 ASSAY GLUCOSE BLOOD QUANT: CPT

## 2020-08-29 PROCEDURE — 96360 HYDRATION IV INFUSION INIT: CPT

## 2020-08-29 PROCEDURE — 81003 URINALYSIS AUTO W/O SCOPE: CPT

## 2020-08-29 PROCEDURE — 87040 BLOOD CULTURE FOR BACTERIA: CPT

## 2020-08-29 PROCEDURE — 83735 ASSAY OF MAGNESIUM: CPT

## 2020-08-29 PROCEDURE — 87086 URINE CULTURE/COLONY COUNT: CPT

## 2020-08-29 RX ADMIN — Medication SCH ML: at 21:36

## 2020-08-29 RX ADMIN — IPRATROPIUM BROMIDE SCH: 0.5 SOLUTION RESPIRATORY (INHALATION) at 20:00

## 2020-08-29 NOTE — P.DS
Admission Date: 08/24/20


Discharge Date: 08/29/20


Disposition: ROUTINE DISCHARGE


Discharge Condition: FAIR


Reason for Admission: Stage IV lung cancer


Consultations: 





Cardiology - Dr. Mae


Pulmonology - Dr. Downey


[facility]  [facility]











Procedures: 





CT Chest 8/25/20


IMPRESSION:  1. Large mediastinal mass, stable.


2. Mediastinal adenopathy which is increased in severity.


3. Moderately severe diffuse pulmonary infiltrates in the left upper lobe.


4. Severe compressive atelectasis of the left lower lobe.


5. Spiculated mass lesions in the upper lobes bilaterally.








TTE: 8/26


COMMENTS:      TECHNICALLY DIFFICULT STUDY.  POSITIVE PLEURAL EFFUSION 

BILATERAL.  


                            NO PERICARDIAL EFFUSION





Placement of Left thoracic Pleur-X catheter





Problem List


Large left lung pleural effusion complicated by recent diagnosis of stage IV 

lung cancer


Type 2 diabetes mellitus


Severe malnutrition


Brief History of Present Illness: 





64-year-old female with a past medical history of diabetes, hypertension, 

depression and low stage IV lung cancer presents to the emergency room with 

complaints of worsening difficulty breathing.  Patient states that for the past 

month she has had increasing difficulty breathing.  Feels like she cannot catch 

her breath.  Daughter called EMS.  On arrival, patient's heart rate was 220-250,

respiratory rate of 32 an initial blood pressure of 60/40.  By the time patient 

presented to the emergency room her blood pressure had improved to 120/80, was 

given some gentle IV fluids in route and 15 mg of Cardizem.





Patient was recently diagnosed with stage IV lung cancer.  She was scheduled for

a PET scan in the next day or 2.  In the emergency room chest x-ray shows no 

significant change in the left lung opacity, mediastinal and hilar 

lymphadenopathy and left pleural effusion.  CT scan of the chest pending.  Lab 

work fairly unremarkable.  Her last set of vitals shows a blood pressure of 

99/63, pulse rate of 118, respiratory rate of 32, temperature 97.2 and a pulse 

oximeter of 94% on room air.  She has lost a lot a weight in the recent few 

months.  She is alert and oriented and in no distress. Has a mild labored 

breathing.  Patient will be placed in observation and further evaluated.  

Patient wants to be DNR.








Hospital Course: 





Large left lung pleural effusion complicated by Stage IV lung cancer


-Pulm consulted, s/p bronch 8/25, s/p pleur-x cath placement 8/26, had 

significant improvement after catheter placed.


-likely caused cardiac stress and contributed to development of SVT (see below)


-discharged with GISELLE drain to use with catheter. Family comfortable caring for 

this.


-patient was to have a Liver biopsy performed on day of discharge, however IR 

felt patient was too weak and recommended outpatient biopsy in the near future.





SVT


-day after admission (8/25) patient developed sustained SVT (narrow complex, 

monomorphic) to 220s, feeling very short of breath and weak.


-vagal maneuvers did not help, Adenosine was given x3 (6mg, 12mg, 12mg) with 

only temporary effect.


-Amiodarone 150mg bolus was given and she was started on a drip, which decreased

patient's HR To 120-130s.


-likely due to pleural effusion in addition to patient's baseline sinus 

tachycardia


-cardiology consulted - recommend switching to beta-blocker PO after 24hrs of 

amiodarone drip and Pleur-X catheter placement








Vital Signs/Physical Exam: 














Temp Pulse Resp BP Pulse Ox


 


 97.8 F   108 H  16   103/64   98 


 


 08/28/20 13:00  08/28/20 14:00  08/28/20 14:00  08/28/20 14:00  08/28/20 14:00








General: Alert, In no apparent distress, Cachectic


HEENT: Atraumatic, PERRLA, EOMI


Neck: Supple, JVD not distended


Respiratory: Diminished (left lung base), Crackles/rales (left lung base)


Cardiovascular: Regular rate/rhythm, Normal S1 S2


Gastrointestinal: Normal bowel sounds, No tenderness


Musculoskeletal: No tenderness


Integumentary: No rashes


Neurological: Normal speech, Normal tone, Normal affect


Laboratory Data at Discharge: 














WBC  26.7 K/uL (4.3-10.9)  H* D 08/28/20  04:34    


 


Hgb  13.1 g/dL (12.0-15.0)   08/28/20  04:34    


 


Hct  40.9 % (36.0-45.0)   08/28/20  04:34    


 


Plt Count  398 K/uL (152-406)  D 08/28/20  04:34    


 


PT  12.3 SECONDS (9.5-12.5)   08/24/20  22:24    


 


INR  1.04   08/24/20  22:24    


 


APTT  26.6 SECONDS (24.3-36.9)   08/24/20  22:24    


 


Sodium  136 mmol/L (136-145)   08/28/20  04:34    


 


Potassium  4.9 mmol/L (3.5-5.1)   08/28/20  04:34    


 


BUN  36 mg/dL (7-18)  H  08/28/20  04:34    


 


Creatinine  0.84 mg/dL (0.55-1.3)   08/28/20  04:34    


 


Glucose  217 mg/dL ()  H  08/28/20  04:34    


 


Phosphorus  3.4 mg/dL (2.5-4.9)   08/25/20  16:58    


 


Magnesium  2.0 mg/dL (1.8-2.4)   08/26/20  04:28    


 


Total Bilirubin  0.5 mg/dL (0.2-1.0)   08/24/20  22:24    


 


AST  32 U/L (15-37)   08/24/20  22:24    


 


ALT  19 U/L (12-78)   08/24/20  22:24    


 


Alkaline Phosphatase  108 U/L ()   08/24/20  22:24    


 


Lipase  52 U/L ()  L  08/24/20  22:24    








Home Medications: 








Cyclobenzaprine [Flexeril*] 1 tab PO BEDTIME 08/26/20 


Ergocalciferol (Vitamin D2) [Vitamin D2] 1 tab PO SEECOM 08/26/20 


Folic Acid 1 tab PO DAILY 08/26/20 


Gabapentin 2 tab PO BEDTIME 08/26/20 


Metformin HCl 500 mg PO BID 08/26/20 


Tramadol HCl [Ultram] 1 tab PO BID PRN 08/26/20 


dexAMETHasone [Dexamethasone] 2 mg PO TID 08/26/20 


Metoprolol Tartrate [Lopressor*] 25 mg PO BID 6AM 6PM 30 Days #60 tab 08/28/20 





New Medications: 


Metoprolol Tartrate [Lopressor*] 25 mg PO BID 6AM 6PM 30 Days #60 tab


Patient Discharge Instructions: Follow up with PCP within 1 week.  Follow up 

with Dr. Downey.  Follow up with Dr. Martinez.  Liver biopsy will need to be set 

up as  outpatient


Diet: AHA


Activity: Ad go


Time spent managing pt's care (in minutes): 45

## 2020-08-29 NOTE — P.HP
Certification for Inpatient


Patient admitted to: Observation


With expected LOS: <2 Midnights


Patient will require the following post-hospital care: Hospice


Practitioner: I am a practitioner with admitting privileges, knowledge of 

patient current condition, hospital course, and medical plan of care.


Services: Services provided to patient in accordance with Admission requirements

found in Title 42 Section 412.3 of the Code of Federal Regulations





<Dao Wu - Last Filed: 08/29/20 19:53>





Patient History


Date of Service: 08/29/20


Reason for admission: Acute hypoxic respiratory failure


History of Present Illness: 





64-year-old female with a past medical history of type 2 diabetes mellitus, 

depression, hypertension and stage IV lung cancer who was discharged from the 

hospital yesterday after receiving a PleurX catheter for chronic pleural 

effusion secondary to her advanced lung cancer.  Daughter brought patient back t

o the ER because the patient feels like she cannot breathe.





In the emergency room patient has mild labored breathing.  Her room air oxygen 

saturations are 88-91% on room air.  Daughter states that she desaturates and 

struggles with minimal effort and movement.  Patient is also not eating.  Has no

appetite and no interest in eating.  Patient will be placed in observation.  She

will likely require home oxygen and family wants to discuss hospice.





- Past Medical/Surgical History


Diabetic: Yes


-: DM


-: HTN


-: Hypercholesterolemia


-: Depression


-: Restless Leg syndrome


-: Lung Cancer Stage 4


-: Samantha


-: Hysterectomy


-: Hernia


-: Hemoroids removed


-: Catarct Surgery


-: appendectomy


Psychosocial/ Personal History: Lives at home





- Family History


  ** Father


-: Lung disease, Cancer


Notes: colon ca, mesothelioma





  ** Mother


-: Heart disease, Hypertension, Diabetes, Cancer


Notes: uterine, Alzheimers





- Social History


Alcohol use: No


CD- Drugs: No


Caffeine use: Yes


Place of Residence: Home





<Dao Wu - Last Filed: 08/29/20 19:53>


Date of Service: 08/30/20





<Reagan Hobbs - Last Filed: 08/30/20 11:04>


Allergies





iodine Allergy (Mild, Verified 01/28/18 21:52)


   Itching/Hives/Rash


hydrocodone Allergy (Verified 08/25/20 01:12)


   Unknown





Home Medications: 








Cyclobenzaprine [Flexeril*] 1 tab PO BEDTIME 08/26/20 


Ergocalciferol (Vitamin D2) [Vitamin D2] 1 tab PO SEECOM 08/26/20 


Folic Acid 1 tab PO DAILY 08/26/20 


Gabapentin 2 tab PO BEDTIME 08/26/20 


Metformin HCl 500 mg PO BID 08/26/20 


Tramadol HCl [Ultram] 1 tab PO BID PRN 08/26/20 


dexAMETHasone [Dexamethasone] 2 mg PO TID 08/26/20 


Metoprolol Tartrate [Lopressor*] 25 mg PO BID 6AM 6PM 30 Days #60 tab 08/28/20 








Review of Systems


General: As per HPI


Eyes: Unremarkable


ENT: Unremarkable


Respiratory: Shortness of Breath, SOB with Excertion


Cardiovascular: Unremarkable


Gastrointestinal: Other (Lack of appetite), Unremarkable


Genitourinary: Unremarkable


Musculoskeletal: Unremarkable


Integumentary: Unremarkable


Neurological: Weakness


Lymphatics: Unremarkable





<Dao Wu - Last Filed: 08/29/20 19:53>





Physical Examination





- Vital Signs


Temperature: 97.2 F


Blood Pressure: 111/68


Pulse: 110


Respirations: 18


Pulse Ox (%): 91 (RA)





- Physical Exam


General: Alert, Oriented x3, Mild distress


HEENT: Atraumatic, Normocephalic, PERRLA, Mucous membr. moist/pink


Neck: Supple, Other (Trachea midline)


Respiratory: Diminished, Other (PleurX catheter in place left chest)


Cardiovascular: No edema, Normal pulses, Normal S1 S2


Capillary refill: <2 Seconds


Gastrointestinal: Normal bowel sounds, Soft and benign, Non-distended


Musculoskeletal: No swelling, No contractures, No erythema


Integumentary: No rashes, No significant lesion, Skin breakdown (Stage II 

pressure ulcer coccyx area)


Neurological: Normal gait, Normal speech, Normal tone, Abnormal strength 

(Generalized debility)





- Studies


Laboratory Data (last 24 hrs)





08/29/20 18:18: PT 11.5, INR 0.97


08/29/20 18:18: WBC 21.7 H* D, Hgb 13.6, Hct 42.6, Plt Count 378


08/29/20 18:18: Sodium 134 L, Potassium 4.8, BUN 43 H, Creatinine 1.06, Glucose 

321 H, Magnesium 2.0, Total Bilirubin 0.4, AST 33, ALT 18, Alkaline Phosphatase 

116








<Dao Wu - Last Filed: 08/29/20 19:53>





- Studies


Laboratory Data (last 24 hrs)





08/29/20 18:18: PT 11.5, INR 0.97


08/29/20 18:18: WBC 21.7 H* D, Hgb 13.6, Hct 42.6, Plt Count 378


08/29/20 18:18: Sodium 134 L, Potassium 4.8, BUN 43 H, Creatinine 1.06, Glucose 

321 H, Magnesium 2.0, Total Bilirubin 0.4, AST 33, ALT 18, Alkaline Phosphatase 

116








<Reagan Hobbs - Last Filed: 08/30/20 11:04>





Assessment and Plan





- Plan





Impression:


Status post thoracentesis with left lung Pleurx catheter in place secondary to a

 history of stage IV lung cancer:


Acute hypoxic respiratory failure:


Generalized debility:


Type 2 diabetes mellitus:


Severe malnutrition:





Plan:


Status post thoracentesis with left lung Pleurx catheter in place secondary to a

 history of stage IV lung cancer:  Patient was discharged yesterday from 

hospital after a thoracentesis and PleurX catheter in left chest.  Patient 

returns complaining of dyspnea on exertion.  Patient has a history of advanced 

lung cancer.  Patient also wants to discuss hospice.


Acute hypoxic respiratory failure:  Room air saturations of 88-91% that likely 

drop significantly with exertion due to her diagnosis of advanced lung cancer.  

Patient will likely require home oxygen and would benefit from home oxygen.


Generalized debility:  Secondary to advanced lung cancer.  Fall precautions.


Type 2 diabetes mellitus:  Will resume all medications.  Accu-Cheks a.c. HS.  

Sliding scale insulin.


Severe malnutrition:  Patient has no appetite.  Patient does not want to eat 

either.  Family concerned.





Discharge Plan: Home


Plan to discharge in: 48 Hours





- Advance Directives


Does patient have a Living Will: No


Does patient have a Durable POA for Healthcare: Yes





- Code Status/Comfort Care


Code Status Assessed: Yes


Time Spent Managing Pts Care (In Minutes): 55





<Dao Wu - Last Filed: 08/29/20 19:53>


Physician Review: Patient Assessed, Agree with Above Assessment and Plan


Physician Review Additional Text: 





Agree with the assessment and plan as documented by the JEANETH on 9/29/2020 





<Reagan Hobbs - Last Filed: 08/30/20 11:04>

## 2020-08-29 NOTE — ER
Nurse's Notes                                                                                     

 [facility]                                                                 

Name: Nery Gaffney                                                                            

Age: 64 yrs                                                                                       

Sex: Female                                                                                       

: 1955                                                                                   

MRN: [id number]                                                                                   

Arrival Date: 2020                                                                          

Time: 17:01                                                                                       

Account#: [id number]                                                                            

Bed 14                                                                                            

Private MD: Buck Pandey R                                                                       

Diagnosis: Hypoxemia                                                                              

                                                                                                  

Presentation:                                                                                     

                                                                                             

17:09 Chief complaint: Patient states: BP was low at home (111/82) and BS was high (529), was iw  

      just d/c from hospital yesterday for fluid on lung, has hx of lung cancer with mets to      

      brain, had drain placed during this visit, is due for a biopsy , is here bc of the BP       

      and BS. Coronavirus screen: At this time, the client does not indicate any symptoms         

      associated with coronavirus-19. Ebola Screen: Patient negative for fever greater than       

      or equal to 101.5 degrees Fahrenheit, and additional compatible Ebola Virus Disease         

      symptoms Patient denies exposure to infectious person. Patient denies travel to an          

      Ebola-affected area in the 21 days before illness onset. No symptoms or risks               

      identified at this time. Initial Sepsis Screen: Does the patient meet any 2 criteria?       

      No. Patient's initial sepsis screen is negative. Does the patient have a suspected          

      source of infection? No. Patient's initial sepsis screen is negative. Risk Assessment:      

      Do you want to hurt yourself or someone else? Patient reports no desire to harm self or     

      others. Onset of symptoms was 2020.                                              

17:09 Method Of Arrival: Wheelchair                                                           iw  

17:09 Acuity: RANJEET 3                                                                           iw  

                                                                                                  

Triage Assessment:                                                                                

18:54 General: Appears ill, cachectic, Behavior is calm, cooperative. Pain: Denies pain.      ls4 

                                                                                                  

Historical:                                                                                       

- Allergies:                                                                                      

17:12 HYDROCODONE;                                                                            iw  

17:12 Iodine; IV contrast;                                                                    iw  

- PMHx:                                                                                           

17:12 Cancer, Lung; Depression; Diabetes - NIDDM; Hyperlipidemia; Hypertension; mediastinal   iw  

      mass;                                                                                       

- PSHx:                                                                                           

17:12 Hernia repair; Hysterectomy; Cholecystectomy;                                           iw  

                                                                                                  

- Immunization history:: Adult Immunizations.                                                     

- Social history:: Smoking status: Patient/guardian denies using tobacco, Stopped _               

  months ago 1 Patient/guardian denies using alcohol, street drugs, The patient lives             

  with spouse.                                                                                    

- Family history:: not pertinent.                                                                 

                                                                                                  

                                                                                                  

Screenin:52 Abuse screen: Denies threats or abuse. Denies injuries from another. Nutritional        ls4 

      screening: No deficits noted. Tuberculosis screening: No symptoms or risk factors           

      identified. Fall Risk None identified.                                                      

                                                                                                  

Assessment:                                                                                       

17:00 Reassessment: Patient appears in no apparent distress at this time. Patient and/or      ls4 

      family updated on plan of care and expected duration. Pain level reassessed. Patient is     

      alert, oriented x 3, equal unlabored respirations, skin warm/dry/pink.                      

17:30 Reassessment: Patient appears in no apparent distress at this time. Patient and/or      ls4 

      family updated on plan of care and expected duration. Pain level reassessed. Patient is     

      alert, oriented x 3, equal unlabored respirations, skin warm/dry/pink.                      

18:00 Reassessment: Patient appears in no apparent distress at this time. Patient and/or      ls4 

      family updated on plan of care and expected duration. Pain level reassessed. Patient is     

      alert, oriented x 3, equal unlabored respirations, skin warm/dry/pink.                      

                                                                                                  

Vital Signs:                                                                                      

17:09  / 68; Pulse 110; Resp 18 S; Temp 97.2(TE); Pulse Ox 91% on R/A; Weight 56.7 kg;  iw  

      Height 5 ft. 7 in. (170.18 cm);                                                             

18:54  / 65; Pulse 102; Resp 16; Temp 97.4(O); Pulse Ox 100% on 2 lpm NC; Pain 0/10;    ls4 

17:09 Body Mass Index 19.58 (56.70 kg, 170.18 cm)                                             iw  

                                                                                                  

ED Course:                                                                                        

17:01 Patient arrived in ED.                                                                  mr  

17:01 Buck Pandey MD is Private Physician.                                                  mr  

17:10 No provider procedures requiring assistance completed. Oxygen administration via nasal  ls4 

      cannula \T\ 2L/min.                                                                           

17:12 Triage completed.                                                                       iw  

17:12 Arm band placed on.                                                                     iw  

17:43 Chuck Clement MD is Attending Physician.                                           ma2 

18:03 Marina Woods, RN is Primary Nurse.                                                     ls4 

18:18 Initial lab(s) drawn, by me, sent to lab. Inserted saline lock: 24 gauge in right hand, jp3 

      using aseptic technique. Blood collected.                                                   

18:21 Patient has correct armband on for positive identification. Bed in low position. Call   jp3 

      light in reach. Side rails up X 1. Warm blanket given. Verbal reassurance given. Pulse      

      ox on. NIBP on.                                                                             

18:26 XRAY Chest (1 view) In Process Unspecified.                                             EDMS

18:50 Buck Pandey MD is Hospitalizing Provider.                                             ma2 

                                                                                                  

Administered Medications:                                                                         

18:15 Drug: NS 0.9% 1000 ml Route: IV; Rate: 1 bolus; Site: right antecubital;                ls4 

19:15 Follow up: IV Status: Completed infusion; IV Intake: 1000ml                             ls4 

                                                                                                  

                                                                                                  

Intake:                                                                                           

19:15 IV: 1000ml; Total: 1000ml.                                                              ls4 

                                                                                                  

Outcome:                                                                                          

18:50 Decision to Hospitalize by Provider.                                                    ma2 

20:55 Admitted to Tele accompanied by tech, via stretcher, room 224, with chart, Report       bb  

      called to  Dinh NAYAK for room 224                                                           

21:13 Patient left the ED.                                                                    ls4 

                                                                                                  

Signatures:                                                                                       

Dispatcher [employer]                           EDMS                                                 

Shirley Allen Brenda, RN                     RN   Herlinda Lay RN                     RN   Chuck De Paz MD MD   ma2                                                  

Titus Diaz                              3                                                  

Marina Woods, NAEL                       RN   ls4                                                  

                                                                                                  

**************************************************************************************************

## 2020-08-29 NOTE — RAD REPORT
EXAM DESCRIPTION:  RAD - Chest Single View - 8/29/2020 6:26 pm

 

CLINICAL HISTORY:  low BP

 

COMPARISON:  Portable August 28

 

TECHNIQUE:  AP portable chest image was obtained 8/29/2020 6:26 pm .

 

FINDINGS:  Minimal left apical pneumothorax is still present. No change in position of the chest tube
. Left parenchymal opacification and pleural opacification have not changed. Prominent interstitial m
arkings in the right lung field also stable. Heart size within normal limits. No right-sided pleural 
effusion.

 

IMPRESSION:  Stable chest as detailed.

## 2020-08-29 NOTE — EDPHYS
Physician Documentation                                                                           

 [facility]                                                                 

Name: Nery Gaffney                                                                            

Age: 64 yrs                                                                                       

Sex: Female                                                                                       

: 1955                                                                                   

MRN: [id number]                                                                                   

Arrival Date: 2020                                                                          

Time: 17:01                                                                                       

Account#: [id number]                                                                            

Bed 14                                                                                            

Private MD: Buck Pandey R                                                                       

ED Physician Chuck Clement                                                                    

HPI:                                                                                              

                                                                                             

18:07 This 64 yrs old  Female presents to ER via Wheelchair with complaints of High  ma2 

      Blood Sugar, High Blood Pressure.                                                           

18:46 This 64 yrs old  Female presents to ER via Wheelchair with complaints of High  ma2 

      Blood Sugar, High Blood Pressure.                                                           

18:46 Onset: The symptoms/episode began/occurred gradually, 1 day(s) ago. Associated signs    ma2 

      and symptoms: Pertinent positives: anorexia. Current symptoms: In the emergency             

      department the patient's symptoms are unchanged from the initial presentation. The          

      patient has not experienced similar symptoms in the past. spo2 88 on RA.                    

18:46 hx of lung ca on chest tube, was just d/c from hospital last nights, without O2 and now ma2 

      her spo2 is 88 on RA, she did not eat since yesterday did not sleep and been sob.           

                                                                                                  

Historical:                                                                                       

- Allergies:                                                                                      

17:12 HYDROCODONE;                                                                            iw  

17:12 Iodine; IV contrast;                                                                    iw  

- PMHx:                                                                                           

17:12 Cancer, Lung; Depression; Diabetes - NIDDM; Hyperlipidemia; Hypertension; mediastinal   iw  

      mass;                                                                                       

- PSHx:                                                                                           

17:12 Hernia repair; Hysterectomy; Cholecystectomy;                                           iw  

                                                                                                  

- Immunization history:: Adult Immunizations.                                                     

- Social history:: Smoking status: Patient/guardian denies using tobacco, Stopped _               

  months ago 1 Patient/guardian denies using alcohol, street drugs, The patient lives             

  with spouse.                                                                                    

- Family history:: not pertinent.                                                                 

                                                                                                  

                                                                                                  

ROS:                                                                                              

18:46 Constitutional: Negative for fever, chills, and weight loss.                            ma2 

18:46 All other systems are negative.                                                             

                                                                                                  

Exam:                                                                                             

18:46 Constitutional:  This is a well developed, well nourished patient who is awake, alert,  ma2 

      and in no acute distress. Chest/axilla:  Normal chest wall appearance and motion.           

      Nontender with no deformity.  No lesions are appreciated. Cardiovascular:  Regular rate     

      and rhythm with a normal S1 and S2.  No gallops, murmurs, or rubs.  Normal PMI, no JVD.     

       No pulse deficits. Respiratory:  chest tube in good order, draining, no pulpe suction,     

      her spo2 ois 91 on 2LnC Lungs have equal breath sounds bilaterally, clear to                

      auscultation and percussion.  No rales, rhonchi or wheezes noted.  No increased work of     

      breathing, no retractions or nasal flaring. Abdomen/GI:  Soft, non-tender, with normal      

      bowel sounds.  No distension or tympany.  No guarding or rebound.  No evidence of           

      tenderness throughout. Back:  No spinal tenderness.  No costovertebral tenderness.          

      Full range of motion. MS/ Extremity:  Pulses equal, no cyanosis.  Neurovascular intact.     

       Full, normal range of motion. Neuro:  Awake and alert, GCS 15, oriented to person,         

      place, time, and situation.  Cranial nerves II-XII grossly intact.  Motor strength 5/5      

      in all extremities.  Sensory grossly intact.  Cerebellar exam normal.  Normal gait.         

                                                                                                  

Vital Signs:                                                                                      

17:09  / 68; Pulse 110; Resp 18 S; Temp 97.2(TE); Pulse Ox 91% on R/A; Weight 56.7 kg;  iw  

      Height 5 ft. 7 in. (170.18 cm);                                                             

18:54  / 65; Pulse 102; Resp 16; Temp 97.4(O); Pulse Ox 100% on 2 lpm NC; Pain 0/10;    ls4 

17:09 Body Mass Index 19.58 (56.70 kg, 170.18 cm)                                             iw  

                                                                                                  

MDM:                                                                                              

17:43 Patient medically screened.                                                             ma2 

18:46 Differential diagnosis: hyperglycemia, hyperthyroidism. Data reviewed: vital signs,     [facility] 

      nurses notes. Counseling: I had a detailed discussion with the patient and/or guardian      

      regarding: the historical points, exam findings, and any diagnostic results supporting      

      the discharge/admit diagnosis, the presence of at least one elevated blood pressure         

      reading (>120/80) during this emergency department visit. Response to treatment: There      

      is no appreciated change of the patient's symptoms at this time. ED course: discussed       

      with Rory, after i called dr bolden and he advised to call hospitalist, accepted by luis fernando.    

                                                                                                  

                                                                                             

17:47 Order name: Basic Metabolic Panel                                                       [facility] 

                                                                                             

17:47 Order name: CBC with Diff                                                               [facility] 

                                                                                             

17:47 Order name: LFT's                                                                       [facility] 

                                                                                             

17:47 Order name: Magnesium                                                                   [facility] 

                                                                                             

17:47 Order name: NT PRO-BNP                                                                  [facility] 

                                                                                             

17:47 Order name: PT-INR; Complete Time: 18:42                                                [facility] 

                                                                                             

17:47 Order name: Troponin (emerg Dept Use Only)                                              [facility] 

                                                                                             

17:47 Order name: XRAY Chest (1 view)                                                         [facility] 

                                                                                             

17:47 Order name: EKG; Complete Time: 17:48                                                   [facility] 

                                                                                             

17:47 Order name: Cardiac monitoring; Complete Time: 18:06                                    [facility] 

                                                                                             

17:47 Order name: EKG - Nurse/Tech; Complete Time: 23:43                                      [facility] 

                                                                                             

17:47 Order name: IV Saline Lock; Complete Time: 18:06                                        [facility] 

                                                                                             

17:47 Order name: Labs collected and sent; Complete Time: 18:06                               [facility] 

                                                                                             

17:47 Order name: O2 Per Protocol; Complete Time: 18:06                                       [facility] 

                                                                                             

17:47 Order name: O2 Sat Monitoring; Complete Time: 18:06                                     [facility] 

                                                                                                  

Administered Medications:                                                                         

18:15 Drug: NS 0.9% 1000 ml Route: IV; Rate: 1 bolus; Site: right antecubital;                ls4 

19:15 Follow up: IV Status: Completed infusion; IV Intake: 1000ml                             ls4 

                                                                                                  

                                                                                                  

Disposition:                                                                                      

20 18:50 Hospitalization ordered by Buck Pandey for Observation. Preliminary diagnosis     

  is Hypoxemia.                                                                                   

- Bed requested for Telemetry/MedSurg (observation).                                              

- Status is Observation.                                                                      ls4 

- Condition is Stable.                                                                            

- Problem is new.                                                                                 

- Symptoms are unchanged.                                                                         

                                                                                                  

                                                                                                  

                                                                                                  

Signatures:                                                                                       

Dispatcher [employer]                           EDMS                                                 

Julia Garcia RN RN                                                      

Herlinda Whitaker RN RN                                                      

Chuck Clement MD MD   [facility]                                                  

Marina Woods RN RN   ls4                                                  

                                                                                                  

Corrections: (The following items were deleted from the chart)                                    

19:20 19:11 CORONAVIRUS+MR.LAB.BRZ ordered. [facility]                                              EDMS

20:31 18:50 Hospitalization Ordered by Buck Pandey MD for Observation. Preliminary diagnosis  mw  

      is Hypoxemia. Bed requested for Telemetry/MedSurg (observation). Status is Observation.     

      Condition is Stable. Problem is new. Symptoms are unchanged. [facility]                            

21:13 20:31 2020 18:50 Hospitalization Ordered by Buck Pandey MD for Observation.       ls4 

      Preliminary diagnosis is Hypoxemia. Bed requested for Telemetry/MedSurg (observation).      

      Status is Observation. Condition is Stable. Problem is new. Symptoms are unchanged. mw      

                                                                                                  

**************************************************************************************************

## 2020-08-30 LAB
BLD SMEAR INTERP: (no result)
BUN BLD-MCNC: 39 MG/DL (ref 7–18)
GLUCOSE SERPLBLD-MCNC: 278 MG/DL (ref 74–106)
HCT VFR BLD CALC: 39.5 % (ref 36–45)
LYMPHOCYTES # SPEC AUTO: 0.4 K/UL (ref 0.7–4.9)
MAGNESIUM SERPL-MCNC: 1.8 MG/DL (ref 1.8–2.4)
MORPHOLOGY BLD-IMP: (no result)
PMV BLD: 8.1 FL (ref 7.6–11.3)
POTASSIUM SERPL-SCNC: 4.4 MMOL/L (ref 3.5–5.1)
RBC # BLD: 4.18 M/UL (ref 3.86–4.86)

## 2020-08-30 RX ADMIN — SODIUM CHLORIDE SCH MLS: 0.9 INJECTION, SOLUTION INTRAVENOUS at 10:19

## 2020-08-30 RX ADMIN — CEFEPIME SCH MLS: 1 INJECTION, POWDER, FOR SOLUTION INTRAMUSCULAR; INTRAVENOUS at 20:31

## 2020-08-30 RX ADMIN — Medication SCH ML: at 20:32

## 2020-08-30 RX ADMIN — ENOXAPARIN SODIUM SCH MG: 40 INJECTION SUBCUTANEOUS at 08:39

## 2020-08-30 RX ADMIN — ALBUMIN HUMAN SCH MLS: 250 SOLUTION INTRAVENOUS at 10:19

## 2020-08-30 RX ADMIN — INSULIN GLARGINE SCH UNITS: 100 INJECTION, SOLUTION SUBCUTANEOUS at 20:41

## 2020-08-30 RX ADMIN — IPRATROPIUM BROMIDE SCH MG: 0.5 SOLUTION RESPIRATORY (INHALATION) at 14:45

## 2020-08-30 RX ADMIN — IPRATROPIUM BROMIDE SCH MG: 0.5 SOLUTION RESPIRATORY (INHALATION) at 19:45

## 2020-08-30 RX ADMIN — METHYLPREDNISOLONE SODIUM SUCCINATE SCH MG: 125 INJECTION, POWDER, FOR SOLUTION INTRAMUSCULAR; INTRAVENOUS at 20:33

## 2020-08-30 RX ADMIN — Medication SCH ML: at 08:40

## 2020-08-30 RX ADMIN — IPRATROPIUM BROMIDE SCH MG: 0.5 SOLUTION RESPIRATORY (INHALATION) at 07:40

## 2020-08-30 RX ADMIN — ALBUMIN HUMAN SCH MLS: 250 SOLUTION INTRAVENOUS at 17:51

## 2020-08-30 RX ADMIN — IPRATROPIUM BROMIDE SCH MG: 0.5 SOLUTION RESPIRATORY (INHALATION) at 01:00

## 2020-08-30 NOTE — P.PN
Subjective


Date of Service: 08/30/20


Chief Complaint: Acute hypoxic respiratory failure


Subjective: Other (Shortness of  breath present Had a run of SVT resolved by 

itself)





Review of Systems


10-point ROS is otherwise unremarkable





Physical Examination





- Vital Signs


Temperature: 96.6 F


Blood Pressure: 119/65


Pulse: 110


Respirations: 20


Pulse Ox (%): 93





- Physical Exam


General: Alert, Mild distress


HEENT: Atraumatic, Normocephalic


Neck: Supple


Respiratory: Diminished, Crackles/rales


Cardiovascular: Regular rate/rhythm, Normal S1 S2, Edema


Capillary refill: <2 Seconds


Gastrointestinal: Soft and benign, W/out hepatosplenomegaly


Musculoskeletal: No clubbing, Swelling


Integumentary: No rashes, No breakdown


Neurological: Normal speech, Normal strength at 5/5 x4 extr


Lymphatics: No axilla or inguinal lymphadenopathy





- Studies


Laboratory Data (last 24 hrs)





08/29/20 18:18: PT 11.5, INR 0.97


08/29/20 18:18: WBC 21.7 H* D, Hgb 13.6, Hct 42.6, Plt Count 378


08/29/20 18:18: Sodium 134 L, Potassium 4.8, BUN 43 H, Creatinine 1.06, Glucose 

321 H, Magnesium 2.0, Total Bilirubin 0.4, AST 33, ALT 18, Alkaline Phosphatase 

116








Assessment & Plan


Physician Review Additional Text: 





Status post thoracentesis with left lung Pleurx catheter in place secondary to a

history of stage IV lung cancer:


Acute hypoxic respiratory failure:


Generalized debility:


Type 2 diabetes mellitus:


Severe malnutrition:


SVT and hypotension





Plan:


Status post thoracentesis with left lung Pleurx catheter in place secondary to a

history of stage IV lung cancer:  


Patient was recently discharged after a thoracentesis and PleurX catheter in 

left chest.


Patient returns complaining of dyspnea on exertion.  


has a history of advanced lung cancer.  


Pain control


oxygen supplementation





Acute hypoxic respiratory failure:  Room air saturations of 88-91% that likely 

drop significantly with exertion due to her diagnosis of advanced lung cancer.  

Patient will likely require home oxygen and would benefit from home oxygen.





Generalized debility:  Secondary to advanced lung cancer.  Fall precautions.





Type 2 diabetes mellitus:  Will resume all medications.  Accu-Cheks a.c. HS.  

Sliding scale insulin.





Severe malnutrition:   nutritional supplements 





SVT and hypotension :  Start her on albumin and IV fluids








Disposition :   social service consult for hospice


                   Discussed with  family


                   Pain control 


                   Awaiting hospice arrangement





Time Spent Managing Pts Care (In Minutes): 42

## 2020-08-30 NOTE — P.PN
Subjective


Date of Service: 08/30/20


Chief Complaint: Shortness of breath





Patient is 64 years of age terminally ill with metastatic lung cancer was is 

discharged admitted back again patient is hypoxic complaining of chest pain not 

doing very well as deteriorated fairly rapidly





Review of Systems


General: Weakness, Other (Weight loss)


Respiratory: Shortness of Breath


Cardiovascular: Chest Pain


Gastrointestinal: Nausea





Physical Examination





- Vital Signs


Temperature: 96.6 F


Blood Pressure: 108/62


Pulse: 108


Respirations: 21


Pulse Ox (%): 93





- Physical Exam


General: Alert, Oriented x3, Mild distress


Respiratory: Diminished (Diminished air entry in the left side), Expiratory 

wheezes


Cardiovascular: No edema, Regular rate/rhythm, Normal S1 S2


Gastrointestinal: Normal bowel sounds, Soft and benign





- Studies


Laboratory Data (last 24 hrs)





08/29/20 18:18: PT 11.5, INR 0.97


08/29/20 18:18: WBC 21.7 H* D, Hgb 13.6, Hct 42.6, Plt Count 378


08/29/20 18:18: Sodium 134 L, Potassium 4.8, BUN 43 H, Creatinine 1.06, Glucose 

321 H, Magnesium 2.0, Total Bilirubin 0.4, AST 33, ALT 18, Alkaline Phosphatase 

116








Assessment & Plan





- Problems (Diagnosis)


(1) Metastatic primary lung cancer


Current Visit: Yes   Status: Acute   


Plan: 


Patient is 64 years of age with metastatic lung cancer she recently had a PleurX

catheter placed on the left side was discharged admitted back again with very 

large mass in the lung labs reviewed patient is hyperglycemic is haziness on the

left side has a PleurX catheter in place catheter is not draining much continue 

with antibiotics patient rescheduled for a liver biopsy air prognosis is very 

poor he wants to be full code as been present at the bedside patient was 

prescribed narcotics by me at home also S presume underlying COPD a given a 

steroids and bronchodilators patient was already on dexamethasone due to 

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


Qualifiers: 


   Laterality: left   Qualified Code(s): C34.92 - Malignant neoplasm of 

unspecified part of left bronchus or lung   


Physician Review: Patient Assessed, Agree with Above Assessment and Plan


Physician Review Additional Text: 





Agree with the assessment and plan as documented by the JEANETH on 9/29/2020

## 2020-08-31 LAB
BUN BLD-MCNC: 38 MG/DL (ref 7–18)
GLUCOSE SERPLBLD-MCNC: 315 MG/DL (ref 74–106)
MAGNESIUM SERPL-MCNC: 2.2 MG/DL (ref 1.8–2.4)
POTASSIUM SERPL-SCNC: 4.9 MMOL/L (ref 3.5–5.1)
UA COMPLETE W REFLEX CULTURE PNL UR: (no result)
UA DIPSTICK W REFLEX MICRO PNL UR: (no result)

## 2020-08-31 RX ADMIN — Medication SCH ML: at 07:40

## 2020-08-31 RX ADMIN — METHYLPREDNISOLONE SODIUM SUCCINATE SCH MG: 125 INJECTION, POWDER, FOR SOLUTION INTRAMUSCULAR; INTRAVENOUS at 07:41

## 2020-08-31 RX ADMIN — INSULIN GLARGINE SCH UNITS: 100 INJECTION, SOLUTION SUBCUTANEOUS at 21:20

## 2020-08-31 RX ADMIN — ENOXAPARIN SODIUM SCH MG: 40 INJECTION SUBCUTANEOUS at 12:03

## 2020-08-31 RX ADMIN — SODIUM CHLORIDE SCH MLS: 0.9 INJECTION, SOLUTION INTRAVENOUS at 21:25

## 2020-08-31 RX ADMIN — ALBUMIN HUMAN SCH MLS: 250 SOLUTION INTRAVENOUS at 02:18

## 2020-08-31 RX ADMIN — CEFEPIME SCH MLS: 1 INJECTION, POWDER, FOR SOLUTION INTRAMUSCULAR; INTRAVENOUS at 07:40

## 2020-08-31 RX ADMIN — METHYLPREDNISOLONE SODIUM SUCCINATE SCH MG: 125 INJECTION, POWDER, FOR SOLUTION INTRAMUSCULAR; INTRAVENOUS at 21:08

## 2020-08-31 RX ADMIN — HUMAN INSULIN SCH UNIT: 100 INJECTION, SOLUTION SUBCUTANEOUS at 15:54

## 2020-08-31 RX ADMIN — IPRATROPIUM BROMIDE SCH MG: 0.5 SOLUTION RESPIRATORY (INHALATION) at 13:49

## 2020-08-31 RX ADMIN — ALBUMIN HUMAN SCH MLS: 250 SOLUTION INTRAVENOUS at 18:00

## 2020-08-31 RX ADMIN — CEFEPIME SCH MLS: 1 INJECTION, POWDER, FOR SOLUTION INTRAMUSCULAR; INTRAVENOUS at 21:08

## 2020-08-31 RX ADMIN — TRAMADOL HYDROCHLORIDE AND ACETAMINOPHEN PRN TAB: 37.5; 325 TABLET, FILM COATED ORAL at 22:32

## 2020-08-31 RX ADMIN — HUMAN INSULIN SCH UNIT: 100 INJECTION, SOLUTION SUBCUTANEOUS at 21:20

## 2020-08-31 RX ADMIN — TRAMADOL HYDROCHLORIDE AND ACETAMINOPHEN PRN TAB: 37.5; 325 TABLET, FILM COATED ORAL at 01:09

## 2020-08-31 RX ADMIN — IPRATROPIUM BROMIDE SCH MG: 0.5 SOLUTION RESPIRATORY (INHALATION) at 01:10

## 2020-08-31 RX ADMIN — IPRATROPIUM BROMIDE SCH MG: 0.5 SOLUTION RESPIRATORY (INHALATION) at 07:45

## 2020-08-31 RX ADMIN — IPRATROPIUM BROMIDE SCH MG: 0.5 SOLUTION RESPIRATORY (INHALATION) at 19:30

## 2020-08-31 RX ADMIN — ENOXAPARIN SODIUM SCH: 40 INJECTION SUBCUTANEOUS at 09:00

## 2020-08-31 RX ADMIN — Medication SCH ML: at 21:08

## 2020-08-31 RX ADMIN — ALBUMIN HUMAN SCH MLS: 250 SOLUTION INTRAVENOUS at 10:00

## 2020-08-31 RX ADMIN — METOPROLOL TARTRATE SCH MG: 25 TABLET ORAL at 17:20

## 2020-08-31 RX ADMIN — SODIUM CHLORIDE SCH MLS: 0.9 INJECTION, SOLUTION INTRAVENOUS at 07:40

## 2020-08-31 NOTE — P.PN
Subjective


Date of Service: 08/31/20


Chief Complaint: Shortness of breath


Subjective: No new changes





continues with SOB, feeling uncomfortable, no appetite, weak/tired





Review of Systems


10-point ROS is otherwise unremarkable





Physical Examination





- Vital Signs


Temperature: 97.0 F


Blood Pressure: 143/69


Pulse: 110


Respirations: 20


Pulse Ox (%): 92





- Physical Exam


General: Cachectic, Other (appears tired/weak)


HEENT: Sclerae nonicteric


Neck: Supple, JVD not distended


Respiratory: Diminished, Crackles/rales (LLL)


Cardiovascular: No edema, Normal S1 S2


Gastrointestinal: Normal bowel sounds, Soft and benign, No tenderness


Musculoskeletal: No erythema, No tenderness


Integumentary: No rashes


Neurological: Normal speech, Normal affect





Assessment & Plan


Physician Review Additional Text: 





Status post thoracentesis with left lung Pleurx catheter in place secondary to a

history of stage IV lung cancer:


Acute hypoxic respiratory failure:


Generalized debility:


Type 2 diabetes mellitus:


Severe malnutrition:


SVT and hypotension





Plan:


Status post thoracentesis with left lung Pleurx catheter in place secondary to a

history of stage IV lung cancer:  


Patient was recently discharged after a thoracentesis and PleurX catheter in 

left chest. Returns complaining of dyspnea on exertion.  


Pain control


oxygen supplementation


reviewed plan of care with patient this morning, discussed hospice, pt states 

"can't give an answer right now"


I explained hospice, and offered to answer any questions that can help her make 

a decision





Acute hypoxic respiratory failure:  


Room air saturations of 88-91% that likely drop significantly with exertion due 

to her diagnosis of advanced lung cancer.  





Generalized debility:  Secondary to advanced lung cancer.  Fall precautions.


Type 2 diabetes mellitus:  Will resume all medications.  Accu-Cheks a.c. HS.  

Sliding scale insulin.


Severe malnutrition:   nutritional supplements, IVF


SVT and hypotension :  IVF, continue home metoprolol








Dispo: pt signed DNR today, still in discussion with family regarding Hospice





Time Spent Managing Pts Care (In Minutes): 35

## 2020-09-01 VITALS — SYSTOLIC BLOOD PRESSURE: 123 MMHG | TEMPERATURE: 97.4 F | DIASTOLIC BLOOD PRESSURE: 72 MMHG

## 2020-09-01 VITALS — OXYGEN SATURATION: 93 %

## 2020-09-01 RX ADMIN — SODIUM CHLORIDE SCH: 0.9 INJECTION, SOLUTION INTRAVENOUS at 03:00

## 2020-09-01 RX ADMIN — CEFEPIME SCH MLS: 1 INJECTION, POWDER, FOR SOLUTION INTRAMUSCULAR; INTRAVENOUS at 08:53

## 2020-09-01 RX ADMIN — IPRATROPIUM BROMIDE SCH MG: 0.5 SOLUTION RESPIRATORY (INHALATION) at 07:43

## 2020-09-01 RX ADMIN — HUMAN INSULIN SCH UNIT: 100 INJECTION, SOLUTION SUBCUTANEOUS at 08:54

## 2020-09-01 RX ADMIN — ENOXAPARIN SODIUM SCH MG: 40 INJECTION SUBCUTANEOUS at 08:54

## 2020-09-01 RX ADMIN — ALBUMIN HUMAN SCH MLS: 250 SOLUTION INTRAVENOUS at 03:00

## 2020-09-01 RX ADMIN — IPRATROPIUM BROMIDE SCH MG: 0.5 SOLUTION RESPIRATORY (INHALATION) at 01:50

## 2020-09-01 RX ADMIN — METOPROLOL TARTRATE SCH MG: 25 TABLET ORAL at 05:23

## 2020-09-01 RX ADMIN — METHYLPREDNISOLONE SODIUM SUCCINATE SCH MG: 125 INJECTION, POWDER, FOR SOLUTION INTRAMUSCULAR; INTRAVENOUS at 08:54

## 2020-09-01 RX ADMIN — Medication SCH: at 08:54

## 2020-09-01 NOTE — EKG
Test Date:    2020-08-30               Test Time:    10:03:43

Technician:   LEE                                     

                                                     

MEASUREMENT RESULTS:                                       

Intervals:                                           

Rate:         119                                    

NY:           136                                    

QRSD:         78                                     

QT:           310                                    

QTc:          436                                    

Axis:                                                

P:            68                                     

NY:           136                                    

QRS:          14                                     

T:            147                                    

                                                     

INTERPRETIVE STATEMENTS:                                       

                                                     

Sinus tachycardia

Low voltage QRS

Septal infarct, age undetermined

T wave abnormality, consider inferior ischemia

T wave abnormality, consider anterolateral ischemia

Abnormal ECG

Compared to ECG 08/29/2020 21:07:08

No significant changes



Electronically Signed On 09-01-20 07:00:12 CDT by Tony Mae

## 2020-09-01 NOTE — P.DS
Admission Date: 08/29/20


Discharge Date: 09/01/20


Disposition: HOSPICE-HOME


Discharge Condition: SERIOUS


Reason for Admission: Shortness of breath


Consultations: 





Pulm - Dr. Downey. Nephrology - Dr. Goetz. 


Procedures: 





CXR: FINDINGS:  Minimal left apical pneumothorax is still present. No change in 

position of the chest tube. Left parenchymal opacification and pleural 

opacification have not changed. Prominent interstitial markings in the right 

lung field also stable. Heart size within normal limits. No right-sided pleural 

effusion.





Problem list


Status post thoracentesis of the left lung Pleurx catheter secondary to pleural 

effusions in the setting of stage IV lung cancer


Acute hypoxic respiratory failure.


Generalized debility


Type 2 diabetes.


Severe malnutrition


SVT


Hypotension


 


Brief History of Present Illness: 





Patient presented there after recent discharge from hospital due to feeling like

she cannot breathe.  No prior hospitalization she underwent clear x catheter 

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


In the emergency room, patient was noted to have mild labored breathing.  Her 

room air air oxygen saturations were 88-90%.  Daughter reported that the patient

desaturates and struggles with minimal effort and movement at home.  She also 

reports no appetite or interest in eating. 


Hospital Course: 





The patient was admitted for further evaluation maintained on oxygen via nasal 

cannula.  Long discussions were had with both the patient and her family.  The 

patient ultimately decided on being discharged back home with hospice care. 


Vital Signs/Physical Exam: 














Temp Pulse Resp BP Pulse Ox


 


 97.4 F   87   18   123/72   98 


 


 09/01/20 08:00  09/01/20 08:00  09/01/20 08:00  09/01/20 08:00  09/01/20 08:00








General: Alert, Cachectic, Other (Appears uncomfortable)


HEENT: Sclerae nonicteric


Neck: JVD not distended


Respiratory: Diminished, Crackles/rales


Cardiovascular: No edema, Regular rate/rhythm, Normal S1 S2


Gastrointestinal: Soft and benign, Non-distended, No tenderness


Musculoskeletal: No erythema, No tenderness


Integumentary: No rashes, No breakdown


Neurological: Normal speech, Normal affect


Laboratory Data at Discharge: 














WBC  22.2 K/uL (4.3-10.9)  H*  08/30/20  06:20    


 


Hgb  12.8 g/dL (12.0-15.0)   08/30/20  06:20    


 


Hct  39.5 % (36.0-45.0)   08/30/20  06:20    


 


Plt Count  357 K/uL (152-406)   08/30/20  06:20    


 


PT  11.5 SECONDS (9.5-12.5)   08/29/20  18:18    


 


INR  0.97   08/29/20  18:18    


 


Sodium  138 mmol/L (136-145)   08/31/20  05:17    


 


Potassium  4.9 mmol/L (3.5-5.1)   08/31/20  05:17    


 


BUN  38 mg/dL (7-18)  H  08/31/20  05:17    


 


Creatinine  0.80 mg/dL (0.55-1.3)   08/31/20  05:17    


 


Glucose  315 mg/dL ()  H  08/31/20  05:17    


 


Magnesium  2.2 mg/dL (1.8-2.4)   08/31/20  05:17    


 


Total Bilirubin  0.4 mg/dL (0.2-1.0)   08/29/20  18:18    


 


AST  33 U/L (15-37)   08/29/20  18:18    


 


ALT  18 U/L (12-78)   08/29/20  18:18    


 


Alkaline Phosphatase  116 U/L ()   08/29/20  18:18    








Home Medications: 








Cyclobenzaprine [Flexeril*] 1 tab PO BEDTIME 08/26/20 


Ergocalciferol (Vitamin D2) [Vitamin D2] 1 tab PO SEECOM 08/26/20 


Folic Acid 1 tab PO DAILY 08/26/20 


Gabapentin 2 tab PO BEDTIME 08/26/20 


Metformin HCl 500 mg PO BID 08/26/20 


Tramadol HCl [Ultram] 1 tab PO BID PRN 08/26/20 


dexAMETHasone [Dexamethasone] 2 mg PO TID 08/26/20 


Metoprolol Tartrate [Lopressor*] 25 mg PO BID 6AM 6PM 30 Days #60 tab 08/28/20 





Diet: Regular


Activity: Ad go


Time spent managing pt's care (in minutes): 55